# Patient Record
Sex: FEMALE | Race: BLACK OR AFRICAN AMERICAN | NOT HISPANIC OR LATINO | Employment: FULL TIME | RURAL
[De-identification: names, ages, dates, MRNs, and addresses within clinical notes are randomized per-mention and may not be internally consistent; named-entity substitution may affect disease eponyms.]

---

## 2018-06-12 LAB — CRC RECOMMENDATION EXT: NORMAL

## 2020-01-15 ENCOUNTER — HISTORICAL (OUTPATIENT)
Dept: ADMINISTRATIVE | Facility: HOSPITAL | Age: 50
End: 2020-01-15

## 2020-01-17 LAB
LAB AP CLINICAL INFORMATION: NORMAL
LAB AP DIAGNOSIS - HISTORICAL: NORMAL
LAB AP GROSS PATHOLOGY - HISTORICAL: NORMAL
LAB AP SPECIMEN SUBMITTED - HISTORICAL: NORMAL

## 2020-03-05 ENCOUNTER — HISTORICAL (OUTPATIENT)
Dept: ADMINISTRATIVE | Facility: HOSPITAL | Age: 50
End: 2020-03-05

## 2020-03-09 LAB
LAB AP CLINICAL INFORMATION: NORMAL
LAB AP GENERAL CAT - HISTORICAL: NORMAL
LAB AP INTERPRETATION/RESULT - HISTORICAL: NEGATIVE
LAB AP SPECIMEN ADEQUACY - HISTORICAL: NORMAL
LAB AP SPECIMEN SUBMITTED - HISTORICAL: NORMAL

## 2021-03-23 ENCOUNTER — HOSPITAL ENCOUNTER (OUTPATIENT)
Dept: RADIOLOGY | Facility: HOSPITAL | Age: 51
Discharge: HOME | End: 2021-03-23
Attending: RADIOLOGY
Payer: COMMERCIAL

## 2021-03-23 DIAGNOSIS — R92.8 ABNORMAL FINDING ON RADIOLOGICAL EXAMINATION OF BREAST: ICD-10-CM

## 2021-03-23 PROCEDURE — 76642 ULTRASOUND BREAST LIMITED: CPT | Mod: TC,LT

## 2021-03-24 DIAGNOSIS — Z97.5 IUD (INTRAUTERINE DEVICE) IN PLACE: Primary | ICD-10-CM

## 2021-06-01 RX ORDER — VALSARTAN AND HYDROCHLOROTHIAZIDE 80; 12.5 MG/1; MG/1
1 TABLET, FILM COATED ORAL DAILY
COMMUNITY

## 2021-06-01 RX ORDER — LEVONORGESTREL 52 MG/1
INTRAUTERINE DEVICE INTRAUTERINE
COMMUNITY
Start: 2021-02-03 | End: 2023-06-12

## 2021-06-07 ENCOUNTER — HOSPITAL ENCOUNTER (OUTPATIENT)
Dept: RADIOLOGY | Facility: HOSPITAL | Age: 51
Discharge: HOME | End: 2021-06-07
Attending: OBSTETRICS & GYNECOLOGY
Payer: COMMERCIAL

## 2021-06-07 ENCOUNTER — PROCEDURE VISIT (OUTPATIENT)
Dept: OBSTETRICS AND GYNECOLOGY | Facility: CLINIC | Age: 51
End: 2021-06-07
Payer: COMMERCIAL

## 2021-06-07 VITALS — DIASTOLIC BLOOD PRESSURE: 78 MMHG | WEIGHT: 293 LBS | SYSTOLIC BLOOD PRESSURE: 122 MMHG

## 2021-06-07 DIAGNOSIS — Z97.5 IUD (INTRAUTERINE DEVICE) IN PLACE: ICD-10-CM

## 2021-06-07 DIAGNOSIS — R87.820 CERVICAL LOW RISK HUMAN PAPILLOMAVIRUS (HPV) DNA TEST POSITIVE: Primary | ICD-10-CM

## 2021-06-07 PROCEDURE — 88142 CYTOPATH C/V THIN LAYER: CPT | Mod: GCY | Performed by: OBSTETRICS & GYNECOLOGY

## 2021-06-07 PROCEDURE — 76856 US EXAM PELVIC COMPLETE: CPT | Mod: 26,,, | Performed by: RADIOLOGY

## 2021-06-07 PROCEDURE — 99213 OFFICE O/P EST LOW 20 MIN: CPT | Mod: S$PBB,,, | Performed by: OBSTETRICS & GYNECOLOGY

## 2021-06-07 PROCEDURE — 76856 US EXAM PELVIC COMPLETE: CPT | Mod: TC

## 2021-06-07 PROCEDURE — 99213 PR OFFICE/OUTPT VISIT, EST, LEVL III, 20-29 MIN: ICD-10-PCS | Mod: S$PBB,,, | Performed by: OBSTETRICS & GYNECOLOGY

## 2021-06-07 PROCEDURE — 76830 TRANSVAGINAL US NON-OB: CPT | Mod: 26,,, | Performed by: RADIOLOGY

## 2021-06-07 PROCEDURE — 76856 US PELVIS COMP WITH TRANSVAG NON-OB (XPD): ICD-10-PCS | Mod: 26,,, | Performed by: RADIOLOGY

## 2021-06-07 PROCEDURE — 76830 US PELVIS COMP WITH TRANSVAG NON-OB (XPD): ICD-10-PCS | Mod: 26,,, | Performed by: RADIOLOGY

## 2021-06-07 PROCEDURE — 87624 HPV HI-RISK TYP POOLED RSLT: CPT | Performed by: OBSTETRICS & GYNECOLOGY

## 2021-06-07 RX ORDER — AMLODIPINE BESYLATE 5 MG/1
5 TABLET ORAL DAILY
COMMUNITY
Start: 2021-05-15

## 2021-06-07 RX ORDER — HYDROCHLOROTHIAZIDE 25 MG/1
25 TABLET ORAL DAILY
COMMUNITY
Start: 2021-05-15

## 2021-06-07 RX ORDER — MONTELUKAST SODIUM 10 MG/1
10 TABLET ORAL
COMMUNITY
Start: 2021-05-15

## 2021-06-09 LAB
GH SERPL-MCNC: NORMAL NG/ML
INSULIN SERPL-ACNC: NORMAL U[IU]/ML
LAB AP CLINICAL INFORMATION: NORMAL
LAB AP GYN INTERPRETATION: NEGATIVE
LAB AP PAP DISCLAIMER COMMENTS: NORMAL
RENIN PLAS-CCNC: NORMAL NG/ML/H

## 2021-06-13 LAB
HPV 16: NEGATIVE
HPV 18: NEGATIVE
HPV OTHER: POSITIVE

## 2021-06-24 DIAGNOSIS — R87.820 CERVICAL LOW RISK HUMAN PAPILLOMAVIRUS (HPV) DNA TEST POSITIVE: Primary | ICD-10-CM

## 2021-07-26 ENCOUNTER — PROCEDURE VISIT (OUTPATIENT)
Dept: OBSTETRICS AND GYNECOLOGY | Facility: CLINIC | Age: 51
End: 2021-07-26
Payer: COMMERCIAL

## 2021-07-26 VITALS — SYSTOLIC BLOOD PRESSURE: 130 MMHG | DIASTOLIC BLOOD PRESSURE: 80 MMHG

## 2021-07-26 DIAGNOSIS — R87.820 CERVICAL LOW RISK HUMAN PAPILLOMAVIRUS (HPV) DNA TEST POSITIVE: Primary | ICD-10-CM

## 2021-07-26 PROCEDURE — 88305 TISSUE EXAM BY PATHOLOGIST: CPT | Mod: SUR | Performed by: OBSTETRICS & GYNECOLOGY

## 2021-07-26 PROCEDURE — 88305 TISSUE EXAM BY PATHOLOGIST: CPT | Mod: 26,,, | Performed by: PATHOLOGY

## 2021-07-26 PROCEDURE — 57454 BX/CURETT OF CERVIX W/SCOPE: CPT | Mod: PBBFAC | Performed by: OBSTETRICS & GYNECOLOGY

## 2021-07-26 PROCEDURE — 88342 SURGICAL PATHOLOGY: ICD-10-PCS | Mod: 26,,, | Performed by: PATHOLOGY

## 2021-07-26 PROCEDURE — 57454 BX/CURETT OF CERVIX W/SCOPE: CPT | Mod: S$PBB,,, | Performed by: OBSTETRICS & GYNECOLOGY

## 2021-07-26 PROCEDURE — 88305 SURGICAL PATHOLOGY: ICD-10-PCS | Mod: 26,,, | Performed by: PATHOLOGY

## 2021-07-26 PROCEDURE — 88342 IMHCHEM/IMCYTCHM 1ST ANTB: CPT | Mod: 26,,, | Performed by: PATHOLOGY

## 2021-07-26 PROCEDURE — 57454 PR COLPOSC,CERVIX W/ADJ VAG,W/BX & CURRETAG: ICD-10-PCS | Mod: S$PBB,,, | Performed by: OBSTETRICS & GYNECOLOGY

## 2021-07-28 LAB
DHEA SERPL-MCNC: NORMAL
ESTROGEN SERPL-MCNC: NORMAL PG/ML
LAB AP CLINICAL INFORMATION: NORMAL
LAB AP GROSS DESCRIPTION: NORMAL
LAB AP LABORATORY NOTES: NORMAL
T3RU NFR SERPL: NORMAL %

## 2021-07-30 ENCOUNTER — TELEPHONE (OUTPATIENT)
Dept: OBSTETRICS AND GYNECOLOGY | Facility: CLINIC | Age: 51
End: 2021-07-30

## 2021-08-11 ENCOUNTER — TELEPHONE (OUTPATIENT)
Dept: OBSTETRICS AND GYNECOLOGY | Facility: CLINIC | Age: 51
End: 2021-08-11

## 2021-08-11 ENCOUNTER — OFFICE VISIT (OUTPATIENT)
Dept: OBSTETRICS AND GYNECOLOGY | Facility: CLINIC | Age: 51
End: 2021-08-11
Payer: COMMERCIAL

## 2021-08-11 DIAGNOSIS — R87.810 ATYPICAL SQUAMOUS CELL CHANGES OF UNDETERMINED SIGNIFICANCE (ASCUS) ON CERVICAL CYTOLOGY WITH POSITIVE HIGH RISK HUMAN PAPILLOMA VIRUS (HPV): Primary | ICD-10-CM

## 2021-08-11 DIAGNOSIS — R87.610 ATYPICAL SQUAMOUS CELL CHANGES OF UNDETERMINED SIGNIFICANCE (ASCUS) ON CERVICAL CYTOLOGY WITH POSITIVE HIGH RISK HUMAN PAPILLOMA VIRUS (HPV): Primary | ICD-10-CM

## 2021-08-11 PROCEDURE — 1160F PR REVIEW ALL MEDS BY PRESCRIBER/CLIN PHARMACIST DOCUMENTED: ICD-10-PCS | Mod: ,,, | Performed by: OBSTETRICS & GYNECOLOGY

## 2021-08-11 PROCEDURE — 1159F MED LIST DOCD IN RCRD: CPT | Mod: ,,, | Performed by: OBSTETRICS & GYNECOLOGY

## 2021-08-11 PROCEDURE — 1160F RVW MEDS BY RX/DR IN RCRD: CPT | Mod: ,,, | Performed by: OBSTETRICS & GYNECOLOGY

## 2021-08-11 PROCEDURE — 99499 UNLISTED E&M SERVICE: CPT | Mod: S$PBB,,, | Performed by: OBSTETRICS & GYNECOLOGY

## 2021-08-11 PROCEDURE — 99499 NO LOS: ICD-10-PCS | Mod: S$PBB,,, | Performed by: OBSTETRICS & GYNECOLOGY

## 2021-08-11 PROCEDURE — 99213 OFFICE O/P EST LOW 20 MIN: CPT | Mod: PBBFAC | Performed by: OBSTETRICS & GYNECOLOGY

## 2021-08-11 PROCEDURE — 1159F PR MEDICATION LIST DOCUMENTED IN MEDICAL RECORD: ICD-10-PCS | Mod: ,,, | Performed by: OBSTETRICS & GYNECOLOGY

## 2021-11-23 ENCOUNTER — HOSPITAL ENCOUNTER (OUTPATIENT)
Dept: RADIOLOGY | Facility: HOSPITAL | Age: 51
Discharge: HOME | End: 2021-11-23
Attending: RADIOLOGY
Payer: COMMERCIAL

## 2021-11-23 DIAGNOSIS — R92.8 ABNORMAL FINDING ON BREAST IMAGING: ICD-10-CM

## 2021-11-23 PROCEDURE — 76642 ULTRASOUND BREAST LIMITED: CPT | Mod: TC,LT

## 2021-11-23 PROCEDURE — 77065 DX MAMMO INCL CAD UNI: CPT | Mod: TC,LT

## 2022-05-24 ENCOUNTER — HOSPITAL ENCOUNTER (OUTPATIENT)
Dept: RADIOLOGY | Facility: HOSPITAL | Age: 52
Discharge: HOME | End: 2022-05-24
Attending: STUDENT IN AN ORGANIZED HEALTH CARE EDUCATION/TRAINING PROGRAM
Payer: COMMERCIAL

## 2022-05-24 DIAGNOSIS — R92.8 ABNORMAL FINDING ON RADIOLOGICAL EXAMINATION OF BREAST: ICD-10-CM

## 2022-05-24 PROCEDURE — 76642 ULTRASOUND BREAST LIMITED: CPT | Mod: TC,LT

## 2022-05-24 PROCEDURE — 25000003 PHARM REV CODE 250: Performed by: STUDENT IN AN ORGANIZED HEALTH CARE EDUCATION/TRAINING PROGRAM

## 2022-05-24 PROCEDURE — 19083 BX BREAST 1ST LESION US IMAG: CPT | Mod: LT

## 2022-05-24 PROCEDURE — A4648 IMPLANTABLE TISSUE MARKER: HCPCS

## 2022-05-24 PROCEDURE — 77065 DX MAMMO INCL CAD UNI: CPT | Mod: TC,LT

## 2022-05-24 PROCEDURE — 77066 DX MAMMO INCL CAD BI: CPT | Mod: TC

## 2022-05-24 RX ORDER — LIDOCAINE HYDROCHLORIDE 10 MG/ML
5 INJECTION INFILTRATION; PERINEURAL ONCE
Status: COMPLETED | OUTPATIENT
Start: 2022-05-24 | End: 2022-05-24

## 2022-05-24 RX ORDER — LIDOCAINE HYDROCHLORIDE AND EPINEPHRINE 10; 10 MG/ML; UG/ML
10 INJECTION, SOLUTION INFILTRATION; PERINEURAL ONCE
Status: COMPLETED | OUTPATIENT
Start: 2022-05-24 | End: 2022-05-24

## 2022-05-24 RX ADMIN — LIDOCAINE HYDROCHLORIDE 5 ML: 10 INJECTION, SOLUTION INFILTRATION; PERINEURAL at 11:05

## 2022-05-24 RX ADMIN — LIDOCAINE HYDROCHLORIDE,EPINEPHRINE BITARTRATE 10 ML: 10; .01 INJECTION, SOLUTION INFILTRATION; PERINEURAL at 11:05

## 2022-05-25 ENCOUNTER — TELEPHONE (OUTPATIENT)
Dept: RADIOLOGY | Facility: HOSPITAL | Age: 52
End: 2022-05-25

## 2022-05-25 LAB
DHEA SERPL-MCNC: NORMAL
ESTROGEN SERPL-MCNC: NORMAL PG/ML
INSULIN SERPL-ACNC: NORMAL U[IU]/ML
LAB AP CLINICAL INFORMATION: NORMAL
LAB AP GROSS DESCRIPTION: NORMAL
LAB AP LABORATORY NOTES: NORMAL
T3RU NFR SERPL: NORMAL %

## 2022-07-12 ENCOUNTER — OFFICE VISIT (OUTPATIENT)
Dept: OTOLARYNGOLOGY | Facility: CLINIC | Age: 52
End: 2022-07-12
Payer: COMMERCIAL

## 2022-07-12 VITALS — HEIGHT: 69 IN | WEIGHT: 293 LBS | BODY MASS INDEX: 43.4 KG/M2

## 2022-07-12 DIAGNOSIS — R09.81 MILD NASAL CONGESTION: ICD-10-CM

## 2022-07-12 DIAGNOSIS — S01.319A LACERATION OF EAR LOBE, UNSPECIFIED LATERALITY, INITIAL ENCOUNTER: Primary | ICD-10-CM

## 2022-07-12 DIAGNOSIS — K21.9 GASTROESOPHAGEAL REFLUX DISEASE WITHOUT ESOPHAGITIS: ICD-10-CM

## 2022-07-12 PROCEDURE — 1160F RVW MEDS BY RX/DR IN RCRD: CPT | Mod: ,,, | Performed by: OTOLARYNGOLOGY

## 2022-07-12 PROCEDURE — 99204 OFFICE O/P NEW MOD 45 MIN: CPT | Mod: S$PBB,,, | Performed by: OTOLARYNGOLOGY

## 2022-07-12 PROCEDURE — 3008F PR BODY MASS INDEX (BMI) DOCUMENTED: ICD-10-PCS | Mod: ,,, | Performed by: OTOLARYNGOLOGY

## 2022-07-12 PROCEDURE — 3008F BODY MASS INDEX DOCD: CPT | Mod: ,,, | Performed by: OTOLARYNGOLOGY

## 2022-07-12 PROCEDURE — 4010F PR ACE/ARB THEARPY RXD/TAKEN: ICD-10-PCS | Mod: ,,, | Performed by: OTOLARYNGOLOGY

## 2022-07-12 PROCEDURE — 1159F MED LIST DOCD IN RCRD: CPT | Mod: ,,, | Performed by: OTOLARYNGOLOGY

## 2022-07-12 PROCEDURE — 99214 OFFICE O/P EST MOD 30 MIN: CPT | Mod: PBBFAC | Performed by: OTOLARYNGOLOGY

## 2022-07-12 PROCEDURE — 1160F PR REVIEW ALL MEDS BY PRESCRIBER/CLIN PHARMACIST DOCUMENTED: ICD-10-PCS | Mod: ,,, | Performed by: OTOLARYNGOLOGY

## 2022-07-12 PROCEDURE — 1159F PR MEDICATION LIST DOCUMENTED IN MEDICAL RECORD: ICD-10-PCS | Mod: ,,, | Performed by: OTOLARYNGOLOGY

## 2022-07-12 PROCEDURE — 4010F ACE/ARB THERAPY RXD/TAKEN: CPT | Mod: ,,, | Performed by: OTOLARYNGOLOGY

## 2022-07-12 PROCEDURE — 99204 PR OFFICE/OUTPT VISIT, NEW, LEVL IV, 45-59 MIN: ICD-10-PCS | Mod: S$PBB,,, | Performed by: OTOLARYNGOLOGY

## 2022-07-12 NOTE — H&P (VIEW-ONLY)
Subjective:       Patient ID: Raina Nuñez is a 51 y.o. female.    Chief Complaint: Sinusitis (Sinus congestion. Pt states she can spit out thick clear mucus at times. ) and Other (Left earlobe split from heavy earrings and right earlobe has torn, denies pain to earlobes. )    HPI  Review of Systems   Constitutional: Negative for chills, fatigue and fever.   HENT: Positive for nasal congestion. Negative for ear discharge and ear pain.    Respiratory: Positive for cough.    Gastrointestinal: Positive for reflux.   All other systems reviewed and are negative.        Objective:      Physical Exam  Constitutional:       Appearance: Normal appearance. She is obese.   HENT:      Head: Normocephalic.      Right Ear: Tympanic membrane, ear canal and external ear normal. Laceration present.      Left Ear: Tympanic membrane, ear canal and external ear normal. Laceration present.      Ears:        Nose: Nose normal.      Right Turbinates: Enlarged.      Left Turbinates: Enlarged.      Mouth/Throat:      Lips: Pink.      Mouth: Mucous membranes are moist.      Pharynx: Oropharynx is clear.   Pulmonary:      Effort: Pulmonary effort is normal.   Skin:     General: Skin is warm and dry.   Neurological:      Mental Status: She is alert.   Psychiatric:         Mood and Affect: Mood normal.         Behavior: Behavior is cooperative.         Assessment:       Problem List Items Addressed This Visit    None     Visit Diagnoses     Laceration of ear lobe, unspecified laterality, initial encounter    -  Primary    Relevant Orders    Case Request Operating Room: REPAIR, LACERATION, BILATERAL EARLOBES (Completed)    HCG Qualitative Urine    Mild nasal congestion        Gastroesophageal reflux disease without esophagitis              Plan:   1. Nasacort  2. Prilosec  3. Laceration of Earlobe repair  Follow up after above.

## 2022-07-18 ENCOUNTER — TELEPHONE (OUTPATIENT)
Dept: OTOLARYNGOLOGY | Facility: CLINIC | Age: 52
End: 2022-07-18
Payer: COMMERCIAL

## 2022-07-22 ENCOUNTER — ANESTHESIA EVENT (OUTPATIENT)
Dept: SURGERY | Facility: HOSPITAL | Age: 52
End: 2022-07-22
Payer: COMMERCIAL

## 2022-07-22 ENCOUNTER — HOSPITAL ENCOUNTER (OUTPATIENT)
Facility: HOSPITAL | Age: 52
Discharge: HOME | End: 2022-07-22
Attending: OTOLARYNGOLOGY | Admitting: OTOLARYNGOLOGY
Payer: COMMERCIAL

## 2022-07-22 ENCOUNTER — ANESTHESIA (OUTPATIENT)
Dept: SURGERY | Facility: HOSPITAL | Age: 52
End: 2022-07-22
Payer: COMMERCIAL

## 2022-07-22 VITALS
RESPIRATION RATE: 18 BRPM | SYSTOLIC BLOOD PRESSURE: 101 MMHG | HEIGHT: 69 IN | BODY MASS INDEX: 43.4 KG/M2 | WEIGHT: 293 LBS | HEART RATE: 87 BPM | OXYGEN SATURATION: 99 % | TEMPERATURE: 98 F | DIASTOLIC BLOOD PRESSURE: 52 MMHG

## 2022-07-22 DIAGNOSIS — S01.312D LACERATION OF LEFT EARLOBE, SUBSEQUENT ENCOUNTER: ICD-10-CM

## 2022-07-22 DIAGNOSIS — S01.319D LACERATION OF EAR LOBE, UNSPECIFIED LATERALITY, SUBSEQUENT ENCOUNTER: Primary | ICD-10-CM

## 2022-07-22 DIAGNOSIS — S01.319A: ICD-10-CM

## 2022-07-22 LAB — POC URINE HCG: NEGATIVE

## 2022-07-22 PROCEDURE — 37000009 HC ANESTHESIA EA ADD 15 MINS: Performed by: OTOLARYNGOLOGY

## 2022-07-22 PROCEDURE — D9220A PRA ANESTHESIA: ICD-10-PCS | Mod: CRNA,,, | Performed by: NURSE ANESTHETIST, CERTIFIED REGISTERED

## 2022-07-22 PROCEDURE — 25000003 PHARM REV CODE 250: Performed by: NURSE ANESTHETIST, CERTIFIED REGISTERED

## 2022-07-22 PROCEDURE — 36000704 HC OR TIME LEV I 1ST 15 MIN: Performed by: OTOLARYNGOLOGY

## 2022-07-22 PROCEDURE — 27000177 HC AIRWAY, LARYNGEAL MASK: Performed by: ANESTHESIOLOGY

## 2022-07-22 PROCEDURE — 71000015 HC POSTOP RECOV 1ST HR: Performed by: OTOLARYNGOLOGY

## 2022-07-22 PROCEDURE — 25000003 PHARM REV CODE 250: Performed by: OTOLARYNGOLOGY

## 2022-07-22 PROCEDURE — 63600175 PHARM REV CODE 636 W HCPCS: Performed by: NURSE ANESTHETIST, CERTIFIED REGISTERED

## 2022-07-22 PROCEDURE — D9220A PRA ANESTHESIA: ICD-10-PCS | Mod: ANES,,, | Performed by: ANESTHESIOLOGY

## 2022-07-22 PROCEDURE — D9220A PRA ANESTHESIA: Mod: CRNA,,, | Performed by: NURSE ANESTHETIST, CERTIFIED REGISTERED

## 2022-07-22 PROCEDURE — 27201423 OPTIME MED/SURG SUP & DEVICES STERILE SUPPLY: Performed by: OTOLARYNGOLOGY

## 2022-07-22 PROCEDURE — 13151 PR RECMPL WND LID,NOS,EAR 1.1-2.5 CM: ICD-10-PCS | Mod: ,,, | Performed by: OTOLARYNGOLOGY

## 2022-07-22 PROCEDURE — 71000033 HC RECOVERY, INTIAL HOUR: Performed by: OTOLARYNGOLOGY

## 2022-07-22 PROCEDURE — 13151 CMPLX RPR E/N/E/L 1.1-2.5 CM: CPT | Mod: ,,, | Performed by: OTOLARYNGOLOGY

## 2022-07-22 PROCEDURE — D9220A PRA ANESTHESIA: Mod: ANES,,, | Performed by: ANESTHESIOLOGY

## 2022-07-22 PROCEDURE — 36000705 HC OR TIME LEV I EA ADD 15 MIN: Performed by: OTOLARYNGOLOGY

## 2022-07-22 PROCEDURE — 63600175 PHARM REV CODE 636 W HCPCS: Performed by: ANESTHESIOLOGY

## 2022-07-22 PROCEDURE — 25000003 PHARM REV CODE 250

## 2022-07-22 PROCEDURE — 27000716 HC OXISENSOR PROBE, ANY SIZE: Performed by: ANESTHESIOLOGY

## 2022-07-22 PROCEDURE — 27000510 HC BLANKET BAIR HUGGER ANY SIZE: Performed by: ANESTHESIOLOGY

## 2022-07-22 PROCEDURE — 37000008 HC ANESTHESIA 1ST 15 MINUTES: Performed by: OTOLARYNGOLOGY

## 2022-07-22 RX ORDER — PROPOFOL 10 MG/ML
VIAL (ML) INTRAVENOUS
Status: DISCONTINUED | OUTPATIENT
Start: 2022-07-22 | End: 2022-07-22

## 2022-07-22 RX ORDER — OXYCODONE HYDROCHLORIDE 5 MG/1
5 TABLET ORAL
Status: DISCONTINUED | OUTPATIENT
Start: 2022-07-22 | End: 2022-07-22 | Stop reason: HOSPADM

## 2022-07-22 RX ORDER — HYDROCODONE BITARTRATE AND ACETAMINOPHEN 7.5; 325 MG/1; MG/1
1 TABLET ORAL EVERY 6 HOURS PRN
Status: DISCONTINUED | OUTPATIENT
Start: 2022-07-22 | End: 2022-07-22 | Stop reason: HOSPADM

## 2022-07-22 RX ORDER — MEPERIDINE HYDROCHLORIDE 25 MG/ML
25 INJECTION INTRAMUSCULAR; INTRAVENOUS; SUBCUTANEOUS EVERY 10 MIN PRN
Status: DISCONTINUED | OUTPATIENT
Start: 2022-07-22 | End: 2022-07-22 | Stop reason: HOSPADM

## 2022-07-22 RX ORDER — MIDAZOLAM HYDROCHLORIDE 1 MG/ML
INJECTION INTRAMUSCULAR; INTRAVENOUS
Status: DISCONTINUED | OUTPATIENT
Start: 2022-07-22 | End: 2022-07-22

## 2022-07-22 RX ORDER — LIDOCAINE HYDROCHLORIDE AND EPINEPHRINE 10; 10 MG/ML; UG/ML
INJECTION, SOLUTION INFILTRATION; PERINEURAL
Status: DISCONTINUED | OUTPATIENT
Start: 2022-07-22 | End: 2022-07-22 | Stop reason: HOSPADM

## 2022-07-22 RX ORDER — ONDANSETRON 2 MG/ML
4 INJECTION INTRAMUSCULAR; INTRAVENOUS DAILY PRN
Status: DISCONTINUED | OUTPATIENT
Start: 2022-07-22 | End: 2022-07-22 | Stop reason: HOSPADM

## 2022-07-22 RX ORDER — HYDROMORPHONE HYDROCHLORIDE 2 MG/ML
0.5 INJECTION, SOLUTION INTRAMUSCULAR; INTRAVENOUS; SUBCUTANEOUS EVERY 5 MIN PRN
Status: DISCONTINUED | OUTPATIENT
Start: 2022-07-22 | End: 2022-07-22 | Stop reason: HOSPADM

## 2022-07-22 RX ORDER — EPHEDRINE SULFATE 50 MG/ML
INJECTION, SOLUTION INTRAVENOUS
Status: DISCONTINUED | OUTPATIENT
Start: 2022-07-22 | End: 2022-07-22

## 2022-07-22 RX ORDER — BACITRACIN ZINC 500 [USP'U]/G
OINTMENT TOPICAL
Status: DISCONTINUED | OUTPATIENT
Start: 2022-07-22 | End: 2022-07-22 | Stop reason: HOSPADM

## 2022-07-22 RX ORDER — MORPHINE SULFATE 8 MG/ML
4 INJECTION INTRAMUSCULAR; INTRAVENOUS; SUBCUTANEOUS EVERY 5 MIN PRN
Status: DISCONTINUED | OUTPATIENT
Start: 2022-07-22 | End: 2022-07-22 | Stop reason: HOSPADM

## 2022-07-22 RX ORDER — LIRAGLUTIDE 6 MG/ML
3 INJECTION, SOLUTION SUBCUTANEOUS
COMMUNITY
End: 2023-06-12

## 2022-07-22 RX ORDER — DEXAMETHASONE SODIUM PHOSPHATE 4 MG/ML
INJECTION, SOLUTION INTRA-ARTICULAR; INTRALESIONAL; INTRAMUSCULAR; INTRAVENOUS; SOFT TISSUE
Status: DISCONTINUED | OUTPATIENT
Start: 2022-07-22 | End: 2022-07-22

## 2022-07-22 RX ORDER — ONDANSETRON 2 MG/ML
INJECTION INTRAMUSCULAR; INTRAVENOUS
Status: DISCONTINUED | OUTPATIENT
Start: 2022-07-22 | End: 2022-07-22

## 2022-07-22 RX ORDER — LIDOCAINE HYDROCHLORIDE 20 MG/ML
INJECTION, SOLUTION EPIDURAL; INFILTRATION; INTRACAUDAL; PERINEURAL
Status: DISCONTINUED | OUTPATIENT
Start: 2022-07-22 | End: 2022-07-22

## 2022-07-22 RX ORDER — SODIUM CHLORIDE, SODIUM LACTATE, POTASSIUM CHLORIDE, CALCIUM CHLORIDE 600; 310; 30; 20 MG/100ML; MG/100ML; MG/100ML; MG/100ML
125 INJECTION, SOLUTION INTRAVENOUS CONTINUOUS
Status: DISCONTINUED | OUTPATIENT
Start: 2022-07-22 | End: 2022-07-22 | Stop reason: HOSPADM

## 2022-07-22 RX ORDER — FENTANYL CITRATE 50 UG/ML
INJECTION, SOLUTION INTRAMUSCULAR; INTRAVENOUS
Status: DISCONTINUED | OUTPATIENT
Start: 2022-07-22 | End: 2022-07-22

## 2022-07-22 RX ORDER — CEFAZOLIN SODIUM 1 G/3ML
INJECTION, POWDER, FOR SOLUTION INTRAMUSCULAR; INTRAVENOUS
Status: DISCONTINUED | OUTPATIENT
Start: 2022-07-22 | End: 2022-07-22

## 2022-07-22 RX ORDER — DIPHENHYDRAMINE HYDROCHLORIDE 50 MG/ML
25 INJECTION INTRAMUSCULAR; INTRAVENOUS EVERY 6 HOURS PRN
Status: DISCONTINUED | OUTPATIENT
Start: 2022-07-22 | End: 2022-07-22 | Stop reason: HOSPADM

## 2022-07-22 RX ADMIN — EPHEDRINE SULFATE 15 MG: 50 INJECTION INTRAVENOUS at 08:07

## 2022-07-22 RX ADMIN — MIDAZOLAM HYDROCHLORIDE 2 MG: 1 INJECTION, SOLUTION INTRAMUSCULAR; INTRAVENOUS at 08:07

## 2022-07-22 RX ADMIN — ONDANSETRON 8 MG: 2 INJECTION INTRAMUSCULAR; INTRAVENOUS at 08:07

## 2022-07-22 RX ADMIN — SODIUM CHLORIDE: 9 INJECTION, SOLUTION INTRAVENOUS at 08:07

## 2022-07-22 RX ADMIN — FENTANYL CITRATE 50 MCG: 50 INJECTION INTRAMUSCULAR; INTRAVENOUS at 08:07

## 2022-07-22 RX ADMIN — LIDOCAINE HYDROCHLORIDE 60 MG: 20 INJECTION, SOLUTION INTRAVENOUS at 08:07

## 2022-07-22 RX ADMIN — PROPOFOL 150 MG: 10 INJECTION, EMULSION INTRAVENOUS at 08:07

## 2022-07-22 RX ADMIN — DEXAMETHASONE SODIUM PHOSPHATE 8 MG: 4 INJECTION, SOLUTION INTRA-ARTICULAR; INTRALESIONAL; INTRAMUSCULAR; INTRAVENOUS; SOFT TISSUE at 08:07

## 2022-07-22 RX ADMIN — EPHEDRINE SULFATE 5 MG: 50 INJECTION INTRAVENOUS at 08:07

## 2022-07-22 RX ADMIN — CEFAZOLIN 2 G: 1 INJECTION, POWDER, FOR SOLUTION INTRAMUSCULAR; INTRAVENOUS; PARENTERAL at 08:07

## 2022-07-22 RX ADMIN — SODIUM CHLORIDE, POTASSIUM CHLORIDE, SODIUM LACTATE AND CALCIUM CHLORIDE 125 ML/HR: 600; 310; 30; 20 INJECTION, SOLUTION INTRAVENOUS at 09:07

## 2022-07-22 NOTE — ANESTHESIA POSTPROCEDURE EVALUATION
Anesthesia Post Evaluation    Patient: Raina Nuñez    Procedure(s) Performed: Procedure(s) (LRB):  REPAIR, LACERATION, BILATERAL EARLOBES (Bilateral)    Final Anesthesia Type: general      Patient location during evaluation: PACU  Post-procedure vital signs: reviewed and stable  Pain management: adequate  Airway patency: patent  ESTHER mitigation strategies: Extubation and recovery carried out in lateral, semiupright, or other nonsupine position  PONV status at discharge: No PONV  Anesthetic complications: no      Cardiovascular status: hemodynamically stable  Respiratory status: unassisted  Hydration status: euvolemic  Follow-up not needed.          Vitals Value Taken Time   /52 07/22/22 1047   Temp 36.5 °C (97.7 °F) 07/22/22 1050   Pulse 93 07/22/22 1049   Resp 18 07/22/22 1045   SpO2 98 % 07/22/22 1049   Vitals shown include unvalidated device data.      Event Time   Out of Recovery 09:47:00         Pain/Edgard Score: Edgard Score: 10 (7/22/2022  9:45 AM)

## 2022-07-22 NOTE — ANESTHESIA PREPROCEDURE EVALUATION
07/22/2022  Raina uNñez is a 52 y.o., female.      Pre-op Assessment    I have reviewed the Patient Summary Reports.     I have reviewed the Nursing Notes. I have reviewed the NPO Status.   I have reviewed the Medications.     Review of Systems  Anesthesia Hx:  No problems with previous Anesthesia    Social:  Non-Smoker, No Alcohol Use    Hematology/Oncology:  Hematology Normal   Oncology Normal     EENT/Dental:EENT/Dental Normal   Cardiovascular:   Hypertension    Pulmonary:  Pulmonary Normal    Renal/:  Renal/ Normal     Hepatic/GI:  Hepatic/GI Normal    Musculoskeletal:  Musculoskeletal Normal    Neurological:  Neurology Normal    Endocrine:  Endocrine Normal  Obesity / BMI > 30  Dermatological:  Skin Normal    Psych:  Psychiatric Normal           Physical Exam  General: Well nourished    Airway:  Mallampati: III / III  Mouth Opening: Normal  TM Distance: > 6 cm  Tongue: Normal  Neck ROM: Normal ROM    Chest/Lungs:  Clear to auscultation, Normal Respiratory Rate    Heart:  Rate: Normal  Rhythm: Regular Rhythm        Anesthesia Plan  Type of Anesthesia, risks & benefits discussed:    Anesthesia Type: Gen Supraglottic Airway  Intra-op Monitoring Plan: Standard ASA Monitors  Post Op Pain Control Plan: multimodal analgesia  Induction:  IV  Informed Consent: Informed consent signed with the Patient and all parties understand the risks and agree with anesthesia plan.  All questions answered.   ASA Score: 2  Day of Surgery Review of History & Physical: H&P Update referred to the surgeon/provider.I have interviewed and examined the patient. I have reviewed the patient's H&P dated: There are no significant changes. H&P completed by Anesthesiologist.    Ready For Surgery From Anesthesia Perspective.     .

## 2022-07-22 NOTE — TRANSFER OF CARE
"Anesthesia Transfer of Care Note    Patient: Raina Nuñez    Procedure(s) Performed: Procedure(s) (LRB):  REPAIR, LACERATION, BILATERAL EARLOBES (Bilateral)    Patient location: PACU    Anesthesia Type: general    Transport from OR: Transported from OR on 100% O2 by closed face mask with adequate spontaneous ventilation    Post pain: adequate analgesia    Post assessment: no apparent anesthetic complications    Post vital signs: stable    Level of consciousness: responds to stimulation    Nausea/Vomiting: no nausea/vomiting    Complications: none    Transfer of care protocol was followed      Last vitals:   Visit Vitals  BP (!) 133/35   Pulse 87   Temp 36.8 °C (98.3 °F)   Resp 11   Ht 5' 9" (1.753 m)   Wt (!) 154.2 kg (340 lb)   LMP 07/11/2022   SpO2 100%   Breastfeeding No   BMI 50.21 kg/m²     "

## 2022-07-22 NOTE — OR NURSING
0913 Rec'd pt to PACU asleep with oral airway in place. VSS. No signs of distress noted. No drainage noted from bilateral ear incision site - no dressing noted. Will continue to monitor.     0920 Oral airway removed. Respirations even and unlabored. Reoriented to time and place. Denies needs.     0947 Out of PACU. VSS. No signs of bleeding/distress noted.     0950 Pt to ASC 4 awake and alert. Mother at bedside. Bedside report given to BIMAL Lorenzana RN. No signs of distress noted. No bleeding/drainage noted to bilateral ear sites. Denies pain/needs. /47, P 86, R 18, O2 97% room air.

## 2022-07-22 NOTE — OP NOTE
After General  anesthesia the laceration was prepped and draped in a sterile fashion. It was infiltrated with local 1% xylocaine with epi 1:100,000 around 2 cc's  The laceration was freshened and was excised  to the subcutaneous tissue . Hemostasis was meticulously obtained it was closed subcuticular with 4 O vicryl the skin was closed with Nylon The opposite ear was done in a likewise manner The patient  tolerated procedure well was taken to RR in stable condition

## 2022-07-22 NOTE — ANESTHESIA PROCEDURE NOTES
Intubation    Date/Time: 7/22/2022 8:29 AM  Performed by: Zander Boyce CRNA  Authorized by: Jose Goodwin MD     Intubation:     Induction:  Intravenous    Intubated:  Postinduction    Attempts:  1    Attempted By:  CRNA    Difficult Airway Encountered?: No      Complications:  None    Airway Device:  Supraglottic airway/LMA    Airway Device Size:  3.0    Style/Cuff Inflation:  Cuffed (inflated to minimal occlusive pressure)    Placement Verified By:  Capnometry    Complicating Factors:  None    Findings Post-Intubation:  BS equal bilateral and atraumatic/condition of teeth unchanged

## 2022-07-28 ENCOUNTER — OFFICE VISIT (OUTPATIENT)
Dept: OTOLARYNGOLOGY | Facility: CLINIC | Age: 52
End: 2022-07-28
Payer: COMMERCIAL

## 2022-07-28 VITALS — BODY MASS INDEX: 43.4 KG/M2 | HEIGHT: 69 IN | WEIGHT: 293 LBS

## 2022-07-28 DIAGNOSIS — S01.319A LACERATION OF EAR LOBE, UNSPECIFIED LATERALITY, INITIAL ENCOUNTER: Primary | ICD-10-CM

## 2022-07-28 PROCEDURE — 99024 PR POST-OP FOLLOW-UP VISIT: ICD-10-PCS | Mod: S$PBB,,, | Performed by: OTOLARYNGOLOGY

## 2022-07-28 PROCEDURE — 1160F PR REVIEW ALL MEDS BY PRESCRIBER/CLIN PHARMACIST DOCUMENTED: ICD-10-PCS | Mod: ,,, | Performed by: OTOLARYNGOLOGY

## 2022-07-28 PROCEDURE — 99024 POSTOP FOLLOW-UP VISIT: CPT | Mod: S$PBB,,, | Performed by: OTOLARYNGOLOGY

## 2022-07-28 PROCEDURE — 3008F BODY MASS INDEX DOCD: CPT | Mod: ,,, | Performed by: OTOLARYNGOLOGY

## 2022-07-28 PROCEDURE — 4010F ACE/ARB THERAPY RXD/TAKEN: CPT | Mod: ,,, | Performed by: OTOLARYNGOLOGY

## 2022-07-28 PROCEDURE — 1159F MED LIST DOCD IN RCRD: CPT | Mod: ,,, | Performed by: OTOLARYNGOLOGY

## 2022-07-28 PROCEDURE — 1159F PR MEDICATION LIST DOCUMENTED IN MEDICAL RECORD: ICD-10-PCS | Mod: ,,, | Performed by: OTOLARYNGOLOGY

## 2022-07-28 PROCEDURE — 4010F PR ACE/ARB THEARPY RXD/TAKEN: ICD-10-PCS | Mod: ,,, | Performed by: OTOLARYNGOLOGY

## 2022-07-28 PROCEDURE — 1160F RVW MEDS BY RX/DR IN RCRD: CPT | Mod: ,,, | Performed by: OTOLARYNGOLOGY

## 2022-07-28 PROCEDURE — 99213 OFFICE O/P EST LOW 20 MIN: CPT | Mod: PBBFAC | Performed by: OTOLARYNGOLOGY

## 2022-07-28 PROCEDURE — 3008F PR BODY MASS INDEX (BMI) DOCUMENTED: ICD-10-PCS | Mod: ,,, | Performed by: OTOLARYNGOLOGY

## 2022-07-28 NOTE — PROGRESS NOTES
Subjective:       Patient ID: Raina Nuñez is a 52 y.o. female.    Chief Complaint: Follow-up (Patient is an 8 day post op for her ear lobes. She states she has some itching but is doing well.)    HPI  Review of Systems    Objective:      Physical Exam  Healing well  Assessment:       1. Laceration of ear lobe, unspecified laterality, initial encounter      sutures removed  Plan:       F/u prn

## 2022-08-16 ENCOUNTER — OFFICE VISIT (OUTPATIENT)
Dept: OBSTETRICS AND GYNECOLOGY | Facility: CLINIC | Age: 52
End: 2022-08-16
Payer: COMMERCIAL

## 2022-08-16 VITALS
DIASTOLIC BLOOD PRESSURE: 72 MMHG | SYSTOLIC BLOOD PRESSURE: 134 MMHG | WEIGHT: 293 LBS | BODY MASS INDEX: 43.4 KG/M2 | HEIGHT: 69 IN

## 2022-08-16 DIAGNOSIS — R87.820 CERVICAL LOW RISK HUMAN PAPILLOMAVIRUS (HPV) DNA TEST POSITIVE: ICD-10-CM

## 2022-08-16 DIAGNOSIS — Z12.4 SCREENING FOR MALIGNANT NEOPLASM OF THE CERVIX: ICD-10-CM

## 2022-08-16 DIAGNOSIS — Z13.1 SCREENING FOR DIABETES MELLITUS: Primary | ICD-10-CM

## 2022-08-16 DIAGNOSIS — Z13.220 SCREENING FOR LIPOID DISORDERS: ICD-10-CM

## 2022-08-16 DIAGNOSIS — Z01.419 WOMEN'S ANNUAL ROUTINE GYNECOLOGICAL EXAMINATION: Primary | ICD-10-CM

## 2022-08-16 PROCEDURE — 99213 OFFICE O/P EST LOW 20 MIN: CPT | Mod: PBBFAC | Performed by: OBSTETRICS & GYNECOLOGY

## 2022-08-16 PROCEDURE — 3078F PR MOST RECENT DIASTOLIC BLOOD PRESSURE < 80 MM HG: ICD-10-PCS | Mod: ,,, | Performed by: OBSTETRICS & GYNECOLOGY

## 2022-08-16 PROCEDURE — 4010F ACE/ARB THERAPY RXD/TAKEN: CPT | Mod: ,,, | Performed by: OBSTETRICS & GYNECOLOGY

## 2022-08-16 PROCEDURE — 4010F PR ACE/ARB THEARPY RXD/TAKEN: ICD-10-PCS | Mod: ,,, | Performed by: OBSTETRICS & GYNECOLOGY

## 2022-08-16 PROCEDURE — 87624 HUMAN PAPILLOMAVIRUS (HPV): ICD-10-PCS | Mod: ,,, | Performed by: CLINICAL MEDICAL LABORATORY

## 2022-08-16 PROCEDURE — 1159F PR MEDICATION LIST DOCUMENTED IN MEDICAL RECORD: ICD-10-PCS | Mod: ,,, | Performed by: OBSTETRICS & GYNECOLOGY

## 2022-08-16 PROCEDURE — 3075F PR MOST RECENT SYSTOLIC BLOOD PRESS GE 130-139MM HG: ICD-10-PCS | Mod: ,,, | Performed by: OBSTETRICS & GYNECOLOGY

## 2022-08-16 PROCEDURE — 88142 CYTOPATH C/V THIN LAYER: CPT | Mod: GCY | Performed by: OBSTETRICS & GYNECOLOGY

## 2022-08-16 PROCEDURE — 87624 HPV HI-RISK TYP POOLED RSLT: CPT | Mod: ,,, | Performed by: CLINICAL MEDICAL LABORATORY

## 2022-08-16 PROCEDURE — 3008F PR BODY MASS INDEX (BMI) DOCUMENTED: ICD-10-PCS | Mod: ,,, | Performed by: OBSTETRICS & GYNECOLOGY

## 2022-08-16 PROCEDURE — 1159F MED LIST DOCD IN RCRD: CPT | Mod: ,,, | Performed by: OBSTETRICS & GYNECOLOGY

## 2022-08-16 PROCEDURE — 99396 PR PREVENTIVE VISIT,EST,40-64: ICD-10-PCS | Mod: S$PBB,,, | Performed by: OBSTETRICS & GYNECOLOGY

## 2022-08-16 PROCEDURE — 3008F BODY MASS INDEX DOCD: CPT | Mod: ,,, | Performed by: OBSTETRICS & GYNECOLOGY

## 2022-08-16 PROCEDURE — 3078F DIAST BP <80 MM HG: CPT | Mod: ,,, | Performed by: OBSTETRICS & GYNECOLOGY

## 2022-08-16 PROCEDURE — 99396 PREV VISIT EST AGE 40-64: CPT | Mod: S$PBB,,, | Performed by: OBSTETRICS & GYNECOLOGY

## 2022-08-16 PROCEDURE — 3075F SYST BP GE 130 - 139MM HG: CPT | Mod: ,,, | Performed by: OBSTETRICS & GYNECOLOGY

## 2022-08-16 NOTE — PATIENT INSTRUCTIONS
Discussed notifying me if any intermenstrual spotting occurs.    Continue yearly screening mammography.    Continue routine glucose cholesterol testing by primary care provider.    We discussed continuing colonoscopy screening.  She states she is due in 2023 she will notify me if she does not receive appointment with gastroenterology.    Follow-up with me yearly or p.r.n. if problems arise.

## 2022-08-16 NOTE — PROGRESS NOTES
Subjective:       Patient ID: Raina Nuñez is a 52 y.o. female.    Chief Complaint: Annual Exam (Here for check up and pap-hx of positive hpv x2 , colposcopy done last year (see path report))    Presents for yearly gyn check.    History of positive HPV x2.  However follow-up colposcopic exam and biopsies revealed no definitive dysplasia.  We discussed repeating Pap today.    She is presently using Mirena IUD.  Continues to have monthly menses approximately 3-5 days.  No intermenstrual spotting.    Review of Systems      Objective:      Physical Exam  Exam conducted with a chaperone present.   HENT:      Head: Normocephalic.      Nose: Nose normal.   Eyes:      Pupils: Pupils are equal, round, and reactive to light.   Cardiovascular:      Rate and Rhythm: Normal rate.   Pulmonary:      Effort: Pulmonary effort is normal.      Breath sounds: Normal breath sounds.   Chest:      Comments: Breasts without palpable masses no skin retraction or nipple dimpling.  Mammography screening done May 2022 received normal letter  Abdominal:      General: Abdomen is flat.      Palpations: Abdomen is soft.   Genitourinary:     General: Normal vulva.      Vagina: Normal.      Cervix: Normal.      Uterus: Normal.       Adnexa: Right adnexa normal and left adnexa normal.      Rectum: Normal.      Comments: External normal vault normal cervix normal to appearance.  Pap taken.  Previous Pap 6/7/21-with positive HPV.    Bimanual exam including rectovaginal exam was unremarkable hemoccult was negative.  Colonoscopy screening done 2018. She will have follow-up colonoscopy as directed by Gastroenterology   Musculoskeletal:         General: Normal range of motion.      Cervical back: Full passive range of motion without pain, normal range of motion and neck supple.   Skin:     General: Skin is dry.   Neurological:      General: No focal deficit present.      Mental Status: She is alert.   Psychiatric:         Attention and  Perception: Attention normal.         Mood and Affect: Mood normal.         Assessment:       1. Women's annual routine gynecological examination    2. Cervical low risk human papillomavirus (HPV) DNA test positive    3. Screening for malignant neoplasm of the cervix        Plan:       Patient Instructions   Discussed notifying me if any intermenstrual spotting occurs.    Continue yearly screening mammography.    Continue routine glucose cholesterol testing by primary care provider.    We discussed continuing colonoscopy screening.  She states she is due in 2023 she will notify me if she does not receive appointment with gastroenterology.    Follow-up with me yearly or p.r.n. if problems arise.

## 2022-08-23 LAB
HPV 16: NEGATIVE
HPV 18: NEGATIVE
HPV OTHER: POSITIVE

## 2022-08-25 DIAGNOSIS — R87.810 CERVICAL HIGH RISK HPV (HUMAN PAPILLOMAVIRUS) TEST POSITIVE: Primary | ICD-10-CM

## 2022-11-21 ENCOUNTER — PROCEDURE VISIT (OUTPATIENT)
Dept: OBSTETRICS AND GYNECOLOGY | Facility: CLINIC | Age: 52
End: 2022-11-21
Payer: COMMERCIAL

## 2022-11-21 VITALS — DIASTOLIC BLOOD PRESSURE: 74 MMHG | SYSTOLIC BLOOD PRESSURE: 124 MMHG

## 2022-11-21 DIAGNOSIS — R87.810 CERVICAL HIGH RISK HPV (HUMAN PAPILLOMAVIRUS) TEST POSITIVE: Primary | ICD-10-CM

## 2022-11-21 DIAGNOSIS — N92.1 MENORRHAGIA WITH IRREGULAR CYCLE: ICD-10-CM

## 2022-11-21 PROCEDURE — 88342 IMHCHEM/IMCYTCHM 1ST ANTB: CPT | Mod: 26,,, | Performed by: PATHOLOGY

## 2022-11-21 PROCEDURE — 88305 TISSUE EXAM BY PATHOLOGIST: CPT | Mod: 26,XU,, | Performed by: PATHOLOGY

## 2022-11-21 PROCEDURE — 88305 SURGICAL PATHOLOGY: ICD-10-PCS | Mod: 26,XU,, | Performed by: PATHOLOGY

## 2022-11-21 PROCEDURE — 88342 IMHCHEM/IMCYTCHM 1ST ANTB: CPT | Mod: SUR | Performed by: OBSTETRICS & GYNECOLOGY

## 2022-11-21 PROCEDURE — 88342 SURGICAL PATHOLOGY: ICD-10-PCS | Mod: 26,,, | Performed by: PATHOLOGY

## 2022-11-21 PROCEDURE — 57454 BX/CURETT OF CERVIX W/SCOPE: CPT | Mod: PBBFAC | Performed by: OBSTETRICS & GYNECOLOGY

## 2022-11-21 PROCEDURE — 57454 BX/CURETT OF CERVIX W/SCOPE: CPT | Mod: S$PBB,,, | Performed by: OBSTETRICS & GYNECOLOGY

## 2022-11-21 PROCEDURE — 57454 PR COLPOSC,CERVIX W/ADJ VAG,W/BX & CURRETAG: ICD-10-PCS | Mod: S$PBB,,, | Performed by: OBSTETRICS & GYNECOLOGY

## 2022-11-21 RX ORDER — LEVOFLOXACIN 500 MG/1
500 TABLET, FILM COATED ORAL DAILY
Status: ON HOLD | COMMUNITY
Start: 2022-11-14 | End: 2023-03-06

## 2022-11-21 RX ORDER — DESOXIMETASONE 2.5 MG/G
CREAM TOPICAL
COMMUNITY
Start: 2022-11-07

## 2022-11-21 NOTE — PATIENT INSTRUCTIONS
Later in the office we discussed colposcopic findings.  We will follow-up cervical biopsy pathology and ECC pathology.      However it is noted that even using IUD she has significantly heavy menses lasting up to 7 days.  Occasionally will require changing bed sheets at night.      She is used over-the-counter medication for dysmenorrhea with only moderate success.      A leave suggested.      On review of chart it is noted that in March of 2021 pelvic ultrasound at that time revealed the uterus measuring 13.0 x 6.7 x 6.7 cm.    We discussed the probability of uterine leiomyoma.    She will have follow-up with me in 3 weeks    We will discussed pathology we will also perform ultrasound at that time.      Check CBC and hCG upon leaving today.

## 2022-11-21 NOTE — PROCEDURES
Raina Nuñez is a 52 y.o. female patient.    BP: 124/74 (11/21/22 1328)       Procedures    11/21/2022      Presents today for colposcopic exam.      Recent Pap was negative but HPV other was persistently positive.    Similar results noted last year.  Therefore she was asked have colposcopic exam.      Cervix was visualized painted with ascetic acid.  On careful examination faint aceto-white area was noted at 11 and 10:00 a.m..  The squamocolumnar junction is inverted slightly within the endocervical canal and could not be seen.  However there was no obvious faint aceto-white area within the endocervical canal.  Biopsies were taken at 10 and 11:00 a.m. other random biopsies were taken at 12 3 and 6:00 a.m. ECC was performed.      IUD string again noted.

## 2022-11-28 ENCOUNTER — HOSPITAL ENCOUNTER (OUTPATIENT)
Dept: RADIOLOGY | Facility: HOSPITAL | Age: 52
Discharge: HOME | End: 2022-11-28
Payer: COMMERCIAL

## 2022-11-28 ENCOUNTER — TELEPHONE (OUTPATIENT)
Dept: RADIOLOGY | Facility: HOSPITAL | Age: 52
End: 2022-11-28
Payer: COMMERCIAL

## 2022-11-28 DIAGNOSIS — Z12.31 BREAST CANCER SCREENING BY MAMMOGRAM: ICD-10-CM

## 2022-11-28 PROCEDURE — 77067 SCR MAMMO BI INCL CAD: CPT | Mod: TC

## 2022-12-14 ENCOUNTER — OFFICE VISIT (OUTPATIENT)
Dept: OBSTETRICS AND GYNECOLOGY | Facility: CLINIC | Age: 52
End: 2022-12-14
Payer: COMMERCIAL

## 2022-12-14 ENCOUNTER — HOSPITAL ENCOUNTER (OUTPATIENT)
Dept: RADIOLOGY | Facility: HOSPITAL | Age: 52
Discharge: HOME | End: 2022-12-14
Attending: OBSTETRICS & GYNECOLOGY
Payer: COMMERCIAL

## 2022-12-14 DIAGNOSIS — N94.6 DYSMENORRHEA: ICD-10-CM

## 2022-12-14 DIAGNOSIS — N92.1 MENORRHAGIA WITH IRREGULAR CYCLE: ICD-10-CM

## 2022-12-14 DIAGNOSIS — N92.0 MENORRHAGIA WITH REGULAR CYCLE: Primary | ICD-10-CM

## 2022-12-14 PROCEDURE — 3044F PR MOST RECENT HEMOGLOBIN A1C LEVEL <7.0%: ICD-10-PCS | Mod: ,,, | Performed by: OBSTETRICS & GYNECOLOGY

## 2022-12-14 PROCEDURE — 4010F ACE/ARB THERAPY RXD/TAKEN: CPT | Mod: ,,, | Performed by: OBSTETRICS & GYNECOLOGY

## 2022-12-14 PROCEDURE — 99212 OFFICE O/P EST SF 10 MIN: CPT | Mod: PBBFAC,25 | Performed by: OBSTETRICS & GYNECOLOGY

## 2022-12-14 PROCEDURE — 4010F PR ACE/ARB THEARPY RXD/TAKEN: ICD-10-PCS | Mod: ,,, | Performed by: OBSTETRICS & GYNECOLOGY

## 2022-12-14 PROCEDURE — 76856 US PELVIS COMP WITH TRANSVAG NON-OB (XPD): ICD-10-PCS | Mod: 26,,, | Performed by: RADIOLOGY

## 2022-12-14 PROCEDURE — 76856 US EXAM PELVIC COMPLETE: CPT | Mod: 26,,, | Performed by: RADIOLOGY

## 2022-12-14 PROCEDURE — 3044F HG A1C LEVEL LT 7.0%: CPT | Mod: ,,, | Performed by: OBSTETRICS & GYNECOLOGY

## 2022-12-14 PROCEDURE — 99499 UNLISTED E&M SERVICE: CPT | Mod: S$PBB,,, | Performed by: OBSTETRICS & GYNECOLOGY

## 2022-12-14 PROCEDURE — 76830 TRANSVAGINAL US NON-OB: CPT | Mod: 26,,, | Performed by: RADIOLOGY

## 2022-12-14 PROCEDURE — 76830 US PELVIS COMP WITH TRANSVAG NON-OB (XPD): ICD-10-PCS | Mod: 26,,, | Performed by: RADIOLOGY

## 2022-12-14 PROCEDURE — 76856 US EXAM PELVIC COMPLETE: CPT | Mod: TC

## 2022-12-14 PROCEDURE — 99499 NO LOS: ICD-10-PCS | Mod: S$PBB,,, | Performed by: OBSTETRICS & GYNECOLOGY

## 2022-12-14 RX ORDER — VALSARTAN 80 MG/1
TABLET ORAL
COMMUNITY
Start: 2022-12-13

## 2022-12-14 NOTE — PROGRESS NOTES
Subjective:       Patient ID: Raina Nuñez is a 52 y.o. female.    Chief Complaint: Follow-up (Last seen in Nov for Colposcopy, Here for f/u Pelvic u/s and to discuss MRI of breast that was recommended per Radioologist)    Presents today for discussion only patient has a history of significant menorrhagia with dysmenorrhea.  Presently using Mirena IUD.  However heavy menses with requiring changing pads throughout the night's soiling undergarments.  In addition she has significant dysmenorrhea occasionally limiting activity.      Follow-up ultrasound today revealed uterus measuring 12.5 x 6.6 x 6.8 cm.  Uterus heterogeneous consistent with small uterine leiomyoma.  Right ovary 3.0 x 2.8 x 3.5 cm with a small 1.9 x 1.6 x 2.3 cm cyst left ovary 2.7 x 2.0 x 2.2 cm.  IUD confirmed in lower uterine segment.      We discussed these are similar size fibroids noted on previous ultrasound 2021.      We further discussed she is had significant menorrhagia unrelieved by IUD.      Because of dysmenorrhea component and uterine fibroids I do not recommend proceeding with ablation.      We discussed proceeding with hysterectomy.  She would like to proceed with hysterectomy secondary to above symptoms.          Problem 2.  We discussed recent mammogram was within normal limits benign findings however risk assessment places her at 23% risk.  We discussed that with risk above 20% as recommended to have additional screening     I will discussed with Dr. Mcdonald having 3D ultrasound done at [facility].    Review of Systems      Objective:      Physical Exam    Assessment:       1. Menorrhagia with regular cycle    2. Dysmenorrhea        Plan:       Patient Instructions   Risks versus benefits of proceeding with hysterectomy discussed     Hysterectomy scheduled.      I will obtain follow-up appointment at [facility] for 3D ultrasound.  She will notify me if she does not receive appointment

## 2022-12-14 NOTE — PATIENT INSTRUCTIONS
Risks versus benefits of proceeding with hysterectomy discussed     Hysterectomy scheduled.      I will obtain follow-up appointment at [facility] for 3D ultrasound.  She will notify me if she does not receive appointment

## 2022-12-15 DIAGNOSIS — N94.6 DYSMENORRHEA: ICD-10-CM

## 2022-12-15 DIAGNOSIS — N92.0 MENORRHAGIA WITH REGULAR CYCLE: Primary | ICD-10-CM

## 2022-12-15 DIAGNOSIS — D25.9 UTERINE LEIOMYOMA, UNSPECIFIED LOCATION: ICD-10-CM

## 2023-01-09 DIAGNOSIS — Z80.3 FHX: BREAST CANCER: Primary | ICD-10-CM

## 2023-01-09 DIAGNOSIS — Z91.89 AT INCREASED RISK OF BREAST CANCER: ICD-10-CM

## 2023-02-27 DIAGNOSIS — Z01.818 PRE-OP EVALUATION: Primary | ICD-10-CM

## 2023-02-28 ENCOUNTER — OFFICE VISIT (OUTPATIENT)
Dept: OBSTETRICS AND GYNECOLOGY | Facility: CLINIC | Age: 53
End: 2023-02-28
Payer: COMMERCIAL

## 2023-02-28 VITALS
BODY MASS INDEX: 43.4 KG/M2 | WEIGHT: 293 LBS | SYSTOLIC BLOOD PRESSURE: 128 MMHG | DIASTOLIC BLOOD PRESSURE: 72 MMHG | HEIGHT: 69 IN

## 2023-02-28 DIAGNOSIS — N92.0 MENORRHAGIA WITH REGULAR CYCLE: Primary | ICD-10-CM

## 2023-02-28 PROCEDURE — 3078F DIAST BP <80 MM HG: CPT | Mod: ,,, | Performed by: OBSTETRICS & GYNECOLOGY

## 2023-02-28 PROCEDURE — 99213 OFFICE O/P EST LOW 20 MIN: CPT | Mod: PBBFAC | Performed by: OBSTETRICS & GYNECOLOGY

## 2023-02-28 PROCEDURE — 4010F ACE/ARB THERAPY RXD/TAKEN: CPT | Mod: ,,, | Performed by: OBSTETRICS & GYNECOLOGY

## 2023-02-28 PROCEDURE — 3078F PR MOST RECENT DIASTOLIC BLOOD PRESSURE < 80 MM HG: ICD-10-PCS | Mod: ,,, | Performed by: OBSTETRICS & GYNECOLOGY

## 2023-02-28 PROCEDURE — 3008F PR BODY MASS INDEX (BMI) DOCUMENTED: ICD-10-PCS | Mod: ,,, | Performed by: OBSTETRICS & GYNECOLOGY

## 2023-02-28 PROCEDURE — 3074F PR MOST RECENT SYSTOLIC BLOOD PRESSURE < 130 MM HG: ICD-10-PCS | Mod: ,,, | Performed by: OBSTETRICS & GYNECOLOGY

## 2023-02-28 PROCEDURE — 3074F SYST BP LT 130 MM HG: CPT | Mod: ,,, | Performed by: OBSTETRICS & GYNECOLOGY

## 2023-02-28 PROCEDURE — 1159F PR MEDICATION LIST DOCUMENTED IN MEDICAL RECORD: ICD-10-PCS | Mod: ,,, | Performed by: OBSTETRICS & GYNECOLOGY

## 2023-02-28 PROCEDURE — 1159F MED LIST DOCD IN RCRD: CPT | Mod: ,,, | Performed by: OBSTETRICS & GYNECOLOGY

## 2023-02-28 PROCEDURE — 99024 POSTOP FOLLOW-UP VISIT: CPT | Mod: ,,, | Performed by: OBSTETRICS & GYNECOLOGY

## 2023-02-28 PROCEDURE — 4010F PR ACE/ARB THEARPY RXD/TAKEN: ICD-10-PCS | Mod: ,,, | Performed by: OBSTETRICS & GYNECOLOGY

## 2023-02-28 PROCEDURE — 99024 PR POST-OP FOLLOW-UP VISIT: ICD-10-PCS | Mod: ,,, | Performed by: OBSTETRICS & GYNECOLOGY

## 2023-02-28 PROCEDURE — 3008F BODY MASS INDEX DOCD: CPT | Mod: ,,, | Performed by: OBSTETRICS & GYNECOLOGY

## 2023-02-28 NOTE — H&P (VIEW-ONLY)
[facility] - Obstetrics And Gynecology  Obstetrics & Gynecology  History & Physical    Patient Name: Raina Nuñez  MRN: [id number]  Admission Date: (Not on file)  Primary Care Provider: Isidoro Johnson II, MD    Subjective:     Chief Complaint/Reason for Admission:  Admitted for robotic hysterectomy with bilateral salpingo-oophorectomy and removal of Mirena IUD.    History of Present Illness:  See office notes below      Office note December 14, 2022    Bryan Badillo MD at 12/14/2022 10:00 AM    Status: Signed         Subjective:       Patient ID: Raina Nuñez is a 52 y.o. female.     Chief Complaint: Follow-up (Last seen in Nov for Colposcopy, Here for f/u Pelvic u/s and to discuss MRI of breast that was recommended per Radioologist)     Presents today for discussion only patient has a history of significant menorrhagia with dysmenorrhea.  Presently using Mirena IUD.  However heavy menses with requiring changing pads throughout the night's soiling undergarments.  In addition she has significant dysmenorrhea occasionally limiting activity.       Follow-up ultrasound today revealed uterus measuring 12.5 x 6.6 x 6.8 cm.  Uterus heterogeneous consistent with small uterine leiomyoma.  Right ovary 3.0 x 2.8 x 3.5 cm with a small 1.9 x 1.6 x 2.3 cm cyst left ovary 2.7 x 2.0 x 2.2 cm.  IUD confirmed in lower uterine segment.       We discussed these are similar size fibroids noted on previous ultrasound 2021.       We further discussed she is had significant menorrhagia unrelieved by IUD.       Because of dysmenorrhea component and uterine fibroids I do not recommend proceeding with ablation.       We discussed proceeding with hysterectomy.  She would like to proceed with hysterectomy secondary to above symptoms.             Problem 2.  We discussed recent mammogram was within normal limits benign findings however risk assessment places her at 23% risk.  We discussed that with risk  above 20% as recommended to have additional screening      I will discussed with Dr. Mcdonald having 3D ultrasound done at [facility].      3D screening was later done by Dr. Mcdonald no abnormalities were noted.  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  Therefore colposcopic exam was done biopsies revealed no evidence of dysplasia.      Current Outpatient Medications on File Prior to Visit   Medication Sig    desoximetasone (TOPICORT) 0.25 % cream APPLY CREAM EXTERNALLY TWICE DAILY. RUB IN GENTLY AND COMPLETELY    hydroCHLOROthiazide (HYDRODIURIL) 25 MG tablet Take 25 mg by mouth once daily.    montelukast (SINGULAIR) 10 mg tablet Take 10 mg by mouth as needed.    valsartan (DIOVAN) 80 MG tablet     amLODIPine (NORVASC) 5 MG tablet Take 5 mg by mouth once daily.    levoFLOXacin (LEVAQUIN) 500 MG tablet Take 500 mg by mouth once daily.    liraglutide, weight loss, (SAXENDA) 3 mg/0.5 mL (18 mg/3 mL) PnIj Inject 3 mg into the skin.    MIRENA 20 mcg/24 hours (6 yrs) 52 mg IUD     valsartan-hydrochlorothiazide (DIOVAN-HCT) 80-12.5 mg per tablet Take 1 tablet by mouth once daily.     No current facility-administered medications on file prior to visit.       Review of patient's allergies indicates:  No Known Allergies    Past Medical History:   Diagnosis Date    Hypertension      OB History    Para Term  AB Living   2 2 2         SAB IAB Ectopic Multiple Live Births                  # Outcome Date GA Lbr Joe/2nd Weight Sex Delivery Anes PTL Lv   2 Term            1 Term              Past Surgical History:   Procedure Laterality Date    BREAST BIOPSY      BREAST SURGERY       SECTION      HYSTEROSCOPY WITH DILATION AND CURETTAGE OF UTERUS      REPAIR OF LACERATION Bilateral 2022    Procedure: REPAIR, LACERATION, BILATERAL EARLOBES;  Surgeon: Sukumar Osuna MD;  Location: [facility];  Service: ENT;  Laterality: Bilateral;     Family  History       Problem Relation (Age of Onset)    Breast cancer Mother    Cancer Maternal Grandmother    Hypertension Maternal Grandmother, Mother          Tobacco Use    Smoking status: Never    Smokeless tobacco: Never   Substance and Sexual Activity    Alcohol use: Yes     Comment: on rare occassions    Drug use: Never    Sexual activity: Yes     Partners: Male     Birth control/protection: I.U.D.     Review of Systems   Respiratory: Negative.     Cardiovascular: Negative.    Endocrine: Negative.    Neurological: Negative.    Hematological: Negative.    Psychiatric/Behavioral: Negative.     Objective:     Vital Signs (Most Recent):  BP: 128/72 (02/28/23 0756) Vital Signs (24h Range):  [unfilled]     Weight: (!) 156.7 kg (345 lb 6.4 oz)  Body mass index is 51.01 kg/m².  Patient's last menstrual period was 02/20/2023 (exact date).    Physical Exam    Laboratory:    Lab Visit on 02/28/2023   Component Date Value Ref Range Status    Sodium 02/28/2023 136  136 - 145 mmol/L Final    Potassium 02/28/2023 3.3 (L)  3.5 - 5.1 mmol/L Final    Chloride 02/28/2023 101  98 - 107 mmol/L Final    CO2 02/28/2023 33 (H)  21 - 32 mmol/L Final    Anion Gap 02/28/2023 5 (L)  7 - 16 mmol/L Final    Glucose 02/28/2023 102  74 - 106 mg/dL Final    BUN 02/28/2023 10  7 - 18 mg/dL Final    Creatinine 02/28/2023 0.95  0.55 - 1.02 mg/dL Final    BUN/Creatinine Ratio 02/28/2023 11  6 - 20 Final    Calcium 02/28/2023 8.8  8.5 - 10.1 mg/dL Final    Total Protein 02/28/2023 7.0  6.4 - 8.2 g/dL Final    Albumin 02/28/2023 3.1 (L)  3.5 - 5.0 g/dL Final    Globulin 02/28/2023 3.9  2.0 - 4.0 g/dL Final    A/G Ratio 02/28/2023 0.8   Final    Bilirubin, Total 02/28/2023 0.5  >0.0 - 1.2 mg/dL Final    Alk Phos 02/28/2023 74  41 - 108 U/L Final    ALT 02/28/2023 15  13 - 56 U/L Final    AST 02/28/2023 14 (L)  15 - 37 U/L Final    eGFR 02/28/2023 73  >=60 mL/min/1.73m² Final    Group & Rh 02/28/2023 A POS   Final    Indirect [place] 02/28/2023 NEG   Final     Pregnancy, Serum 02/28/2023 Negative  Negative, QNS Final    WBC 02/28/2023 5.28  4.50 - 11.00 K/uL Final    RBC 02/28/2023 4.48  4.20 - 5.40 M/uL Final    Hemoglobin 02/28/2023 12.5  12.0 - 16.0 g/dL Final    Hematocrit 02/28/2023 38.9  38.0 - 47.0 % Final    MCV 02/28/2023 86.8  80.0 - 96.0 fL Final    MCH 02/28/2023 27.9  27.0 - 31.0 pg Final    MCHC 02/28/2023 32.1  32.0 - 36.0 g/dL Final    RDW 02/28/2023 14.2  11.5 - 14.5 % Final    Platelet Count 02/28/2023 289  150 - 400 K/uL Final    MPV 02/28/2023 10.0  9.4 - 12.4 fL Final    Neutrophils % 02/28/2023 55.8  53.0 - 65.0 % Final    Lymphocytes % 02/28/2023 32.6  27.0 - 41.0 % Final    Monocytes % 02/28/2023 9.1 (H)  2.0 - 6.0 % Final    Eosinophils % 02/28/2023 1.9  1.0 - 4.0 % Final    Basophils % 02/28/2023 0.2  0.0 - 1.0 % Final    Immature Granulocytes % 02/28/2023 0.4  0.0 - 0.4 % Final    nRBC, Auto 02/28/2023 0.0  <=0.0 % Final    Neutrophils, Abs 02/28/2023 2.95  1.80 - 7.70 K/uL Final    Lymphocytes, Absolute 02/28/2023 1.72  1.00 - 4.80 K/uL Final    Monocytes, Absolute 02/28/2023 0.48  0.00 - 0.80 K/uL Final    Eosinophils, Absolute 02/28/2023 0.10  0.00 - 0.50 K/uL Final    Basophils, Absolute 02/28/2023 0.01  0.00 - 0.20 K/uL Final    Immature Granulocytes, Absolute 02/28/2023 0.02  0.00 - 0.04 K/uL Final    nRBC, Absolute 02/28/2023 0.00  <=0.00 x10e3/uL Final    Diff Type 02/28/2023 Auto   Final                        Assessment/Plan:   Discussed proceeding with removal of IUD and robotically assisted hysterectomy with BSO.      The risk of surgery including anesthetic risk bleeding infection blood clots bowel bladder or ureter injury discussed.    pyridium prescribed.    Will give prophylactic heparin 5000 units subQ preoperatively secondary to BMI of 51.           Bryan Badillo MD  Obstetrics & Gynecology  [facility] - Obstetrics And Gynecology

## 2023-02-28 NOTE — PROGRESS NOTES
[facility] - Obstetrics And Gynecology  Obstetrics & Gynecology  History & Physical    Patient Name: Raina Nuñez  MRN: [id number]  Admission Date: (Not on file)  Primary Care Provider: Isidoro Johnson II, MD    Subjective:     Chief Complaint/Reason for Admission:  Admitted for robotic hysterectomy with bilateral salpingo-oophorectomy and removal of Mirena IUD.    History of Present Illness:  See office notes below      Office note December 14, 2022    Bryan Badillo MD at 12/14/2022 10:00 AM    Status: Signed         Subjective:       Patient ID: Raina Nuñez is a 52 y.o. female.     Chief Complaint: Follow-up (Last seen in Nov for Colposcopy, Here for f/u Pelvic u/s and to discuss MRI of breast that was recommended per Radioologist)     Presents today for discussion only patient has a history of significant menorrhagia with dysmenorrhea.  Presently using Mirena IUD.  However heavy menses with requiring changing pads throughout the night's soiling undergarments.  In addition she has significant dysmenorrhea occasionally limiting activity.       Follow-up ultrasound today revealed uterus measuring 12.5 x 6.6 x 6.8 cm.  Uterus heterogeneous consistent with small uterine leiomyoma.  Right ovary 3.0 x 2.8 x 3.5 cm with a small 1.9 x 1.6 x 2.3 cm cyst left ovary 2.7 x 2.0 x 2.2 cm.  IUD confirmed in lower uterine segment.       We discussed these are similar size fibroids noted on previous ultrasound 2021.       We further discussed she is had significant menorrhagia unrelieved by IUD.       Because of dysmenorrhea component and uterine fibroids I do not recommend proceeding with ablation.       We discussed proceeding with hysterectomy.  She would like to proceed with hysterectomy secondary to above symptoms.             Problem 2.  We discussed recent mammogram was within normal limits benign findings however risk assessment places her at 23% risk.  We discussed that with risk  above 20% as recommended to have additional screening      I will discussed with Dr. Mcdonald having 3D ultrasound done at [facility].      3D screening was later done by Dr. Mcdonald no abnormalities were noted.  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  Therefore colposcopic exam was done biopsies revealed no evidence of dysplasia.      Current Outpatient Medications on File Prior to Visit   Medication Sig    desoximetasone (TOPICORT) 0.25 % cream APPLY CREAM EXTERNALLY TWICE DAILY. RUB IN GENTLY AND COMPLETELY    hydroCHLOROthiazide (HYDRODIURIL) 25 MG tablet Take 25 mg by mouth once daily.    montelukast (SINGULAIR) 10 mg tablet Take 10 mg by mouth as needed.    valsartan (DIOVAN) 80 MG tablet     amLODIPine (NORVASC) 5 MG tablet Take 5 mg by mouth once daily.    levoFLOXacin (LEVAQUIN) 500 MG tablet Take 500 mg by mouth once daily.    liraglutide, weight loss, (SAXENDA) 3 mg/0.5 mL (18 mg/3 mL) PnIj Inject 3 mg into the skin.    MIRENA 20 mcg/24 hours (6 yrs) 52 mg IUD     valsartan-hydrochlorothiazide (DIOVAN-HCT) 80-12.5 mg per tablet Take 1 tablet by mouth once daily.     No current facility-administered medications on file prior to visit.       Review of patient's allergies indicates:  No Known Allergies    Past Medical History:   Diagnosis Date    Hypertension      OB History    Para Term  AB Living   2 2 2         SAB IAB Ectopic Multiple Live Births                  # Outcome Date GA Lbr Joe/2nd Weight Sex Delivery Anes PTL Lv   2 Term            1 Term              Past Surgical History:   Procedure Laterality Date    BREAST BIOPSY      BREAST SURGERY       SECTION      HYSTEROSCOPY WITH DILATION AND CURETTAGE OF UTERUS      REPAIR OF LACERATION Bilateral 2022    Procedure: REPAIR, LACERATION, BILATERAL EARLOBES;  Surgeon: Sukumar Osuna MD;  Location: [facility];  Service: ENT;  Laterality: Bilateral;     Family  History       Problem Relation (Age of Onset)    Breast cancer Mother    Cancer Maternal Grandmother    Hypertension Maternal Grandmother, Mother          Tobacco Use    Smoking status: Never    Smokeless tobacco: Never   Substance and Sexual Activity    Alcohol use: Yes     Comment: on rare occassions    Drug use: Never    Sexual activity: Yes     Partners: Male     Birth control/protection: I.U.D.     Review of Systems   Respiratory: Negative.     Cardiovascular: Negative.    Endocrine: Negative.    Neurological: Negative.    Hematological: Negative.    Psychiatric/Behavioral: Negative.     Objective:     Vital Signs (Most Recent):  BP: 128/72 (02/28/23 0756) Vital Signs (24h Range):  [unfilled]     Weight: (!) 156.7 kg (345 lb 6.4 oz)  Body mass index is 51.01 kg/m².  Patient's last menstrual period was 02/20/2023 (exact date).    Physical Exam    Laboratory:    Lab Visit on 02/28/2023   Component Date Value Ref Range Status    Sodium 02/28/2023 136  136 - 145 mmol/L Final    Potassium 02/28/2023 3.3 (L)  3.5 - 5.1 mmol/L Final    Chloride 02/28/2023 101  98 - 107 mmol/L Final    CO2 02/28/2023 33 (H)  21 - 32 mmol/L Final    Anion Gap 02/28/2023 5 (L)  7 - 16 mmol/L Final    Glucose 02/28/2023 102  74 - 106 mg/dL Final    BUN 02/28/2023 10  7 - 18 mg/dL Final    Creatinine 02/28/2023 0.95  0.55 - 1.02 mg/dL Final    BUN/Creatinine Ratio 02/28/2023 11  6 - 20 Final    Calcium 02/28/2023 8.8  8.5 - 10.1 mg/dL Final    Total Protein 02/28/2023 7.0  6.4 - 8.2 g/dL Final    Albumin 02/28/2023 3.1 (L)  3.5 - 5.0 g/dL Final    Globulin 02/28/2023 3.9  2.0 - 4.0 g/dL Final    A/G Ratio 02/28/2023 0.8   Final    Bilirubin, Total 02/28/2023 0.5  >0.0 - 1.2 mg/dL Final    Alk Phos 02/28/2023 74  41 - 108 U/L Final    ALT 02/28/2023 15  13 - 56 U/L Final    AST 02/28/2023 14 (L)  15 - 37 U/L Final    eGFR 02/28/2023 73  >=60 mL/min/1.73m² Final    Group & Rh 02/28/2023 A POS   Final    Indirect Edinson 02/28/2023 NEG   Final     Pregnancy, Serum 02/28/2023 Negative  Negative, QNS Final    WBC 02/28/2023 5.28  4.50 - 11.00 K/uL Final    RBC 02/28/2023 4.48  4.20 - 5.40 M/uL Final    Hemoglobin 02/28/2023 12.5  12.0 - 16.0 g/dL Final    Hematocrit 02/28/2023 38.9  38.0 - 47.0 % Final    MCV 02/28/2023 86.8  80.0 - 96.0 fL Final    MCH 02/28/2023 27.9  27.0 - 31.0 pg Final    MCHC 02/28/2023 32.1  32.0 - 36.0 g/dL Final    RDW 02/28/2023 14.2  11.5 - 14.5 % Final    Platelet Count 02/28/2023 289  150 - 400 K/uL Final    MPV 02/28/2023 10.0  9.4 - 12.4 fL Final    Neutrophils % 02/28/2023 55.8  53.0 - 65.0 % Final    Lymphocytes % 02/28/2023 32.6  27.0 - 41.0 % Final    Monocytes % 02/28/2023 9.1 (H)  2.0 - 6.0 % Final    Eosinophils % 02/28/2023 1.9  1.0 - 4.0 % Final    Basophils % 02/28/2023 0.2  0.0 - 1.0 % Final    Immature Granulocytes % 02/28/2023 0.4  0.0 - 0.4 % Final    nRBC, Auto 02/28/2023 0.0  <=0.0 % Final    Neutrophils, Abs 02/28/2023 2.95  1.80 - 7.70 K/uL Final    Lymphocytes, Absolute 02/28/2023 1.72  1.00 - 4.80 K/uL Final    Monocytes, Absolute 02/28/2023 0.48  0.00 - 0.80 K/uL Final    Eosinophils, Absolute 02/28/2023 0.10  0.00 - 0.50 K/uL Final    Basophils, Absolute 02/28/2023 0.01  0.00 - 0.20 K/uL Final    Immature Granulocytes, Absolute 02/28/2023 0.02  0.00 - 0.04 K/uL Final    nRBC, Absolute 02/28/2023 0.00  <=0.00 x10e3/uL Final    Diff Type 02/28/2023 Auto   Final                        Assessment/Plan:   Discussed proceeding with removal of IUD and robotically assisted hysterectomy with BSO.      The risk of surgery including anesthetic risk bleeding infection blood clots bowel bladder or ureter injury discussed.    pyridium prescribed.    Will give prophylactic heparin 5000 units subQ preoperatively secondary to BMI of 51.           Bryan Badillo MD  Obstetrics & Gynecology  [facility] - Obstetrics And Gynecology

## 2023-02-28 NOTE — PATIENT INSTRUCTIONS
Discussed proceeding with robotically assisted hysterectomy with BSO secondary to persistent menorrhagia and dysmenorrhea unresponsive to conservative management.      The risk of surgery including anesthetic risk bleeding infection blood clots bowel bladder or ureter injury have been discussed.      Pyridium  prescribed to be taken preoperatively.

## 2023-03-02 RX ORDER — POTASSIUM CHLORIDE 20 MEQ/1
20 TABLET, EXTENDED RELEASE ORAL DAILY
Qty: 30 TABLET | Refills: 0 | Status: SHIPPED | OUTPATIENT
Start: 2023-03-02 | End: 2024-03-01

## 2023-03-06 ENCOUNTER — HOSPITAL ENCOUNTER (OUTPATIENT)
Facility: HOSPITAL | Age: 53
Discharge: HOME | End: 2023-03-07
Attending: OBSTETRICS & GYNECOLOGY | Admitting: OBSTETRICS & GYNECOLOGY
Payer: COMMERCIAL

## 2023-03-06 ENCOUNTER — ANESTHESIA EVENT (OUTPATIENT)
Dept: SURGERY | Facility: HOSPITAL | Age: 53
End: 2023-03-06
Payer: COMMERCIAL

## 2023-03-06 ENCOUNTER — ANESTHESIA (OUTPATIENT)
Dept: SURGERY | Facility: HOSPITAL | Age: 53
End: 2023-03-06
Payer: COMMERCIAL

## 2023-03-06 DIAGNOSIS — N92.0 MENORRHAGIA WITH REGULAR CYCLE: ICD-10-CM

## 2023-03-06 DIAGNOSIS — N92.0 MENORRHAGIA: ICD-10-CM

## 2023-03-06 DIAGNOSIS — N94.6 DYSMENORRHEA: ICD-10-CM

## 2023-03-06 DIAGNOSIS — D25.9 UTERINE LEIOMYOMA, UNSPECIFIED LOCATION: ICD-10-CM

## 2023-03-06 LAB
B-HCG UR QL: NEGATIVE
CTP QC/QA: YES
HCT VFR BLD AUTO: 41.9 % (ref 38–47)
HGB BLD-MCNC: 13 G/DL (ref 12–16)
POTASSIUM SERPL-SCNC: 3.7 MMOL/L (ref 3.5–5.1)

## 2023-03-06 PROCEDURE — 36000713 HC OR TIME LEV V EA ADD 15 MIN: Performed by: OBSTETRICS & GYNECOLOGY

## 2023-03-06 PROCEDURE — 88300 SURGICAL PATHOLOGY: ICD-10-PCS | Mod: 26,,, | Performed by: PATHOLOGY

## 2023-03-06 PROCEDURE — 63600175 PHARM REV CODE 636 W HCPCS: Performed by: NURSE ANESTHETIST, CERTIFIED REGISTERED

## 2023-03-06 PROCEDURE — 88300 SURGICAL PATH GROSS: CPT | Mod: TC,SUR | Performed by: OBSTETRICS & GYNECOLOGY

## 2023-03-06 PROCEDURE — 71000039 HC RECOVERY, EACH ADD'L HOUR: Performed by: OBSTETRICS & GYNECOLOGY

## 2023-03-06 PROCEDURE — 63600175 PHARM REV CODE 636 W HCPCS: Performed by: ANESTHESIOLOGY

## 2023-03-06 PROCEDURE — 85014 HEMATOCRIT: CPT | Performed by: OBSTETRICS & GYNECOLOGY

## 2023-03-06 PROCEDURE — 71000033 HC RECOVERY, INTIAL HOUR: Performed by: OBSTETRICS & GYNECOLOGY

## 2023-03-06 PROCEDURE — 88307 SURGICAL PATHOLOGY: ICD-10-PCS | Mod: 26,,, | Performed by: PATHOLOGY

## 2023-03-06 PROCEDURE — 25000003 PHARM REV CODE 250: Performed by: NURSE ANESTHETIST, CERTIFIED REGISTERED

## 2023-03-06 PROCEDURE — 37000009 HC ANESTHESIA EA ADD 15 MINS: Performed by: OBSTETRICS & GYNECOLOGY

## 2023-03-06 PROCEDURE — 37000008 HC ANESTHESIA 1ST 15 MINUTES: Performed by: OBSTETRICS & GYNECOLOGY

## 2023-03-06 PROCEDURE — C2628 CATHETER, OCCLUSION: HCPCS | Performed by: OBSTETRICS & GYNECOLOGY

## 2023-03-06 PROCEDURE — 88307 TISSUE EXAM BY PATHOLOGIST: CPT | Mod: 26,,, | Performed by: PATHOLOGY

## 2023-03-06 PROCEDURE — 63600175 PHARM REV CODE 636 W HCPCS: Performed by: OBSTETRICS & GYNECOLOGY

## 2023-03-06 PROCEDURE — 27201423 OPTIME MED/SURG SUP & DEVICES STERILE SUPPLY: Performed by: OBSTETRICS & GYNECOLOGY

## 2023-03-06 PROCEDURE — 84132 ASSAY OF SERUM POTASSIUM: CPT | Performed by: OBSTETRICS & GYNECOLOGY

## 2023-03-06 PROCEDURE — 94761 N-INVAS EAR/PLS OXIMETRY MLT: CPT

## 2023-03-06 PROCEDURE — 36000712 HC OR TIME LEV V 1ST 15 MIN: Performed by: OBSTETRICS & GYNECOLOGY

## 2023-03-06 PROCEDURE — D9220A PRA ANESTHESIA: Mod: CRNA,,, | Performed by: NURSE ANESTHETIST, CERTIFIED REGISTERED

## 2023-03-06 PROCEDURE — 81025 URINE PREGNANCY TEST: CPT | Performed by: OBSTETRICS & GYNECOLOGY

## 2023-03-06 PROCEDURE — D9220A PRA ANESTHESIA: Mod: ANES,,, | Performed by: ANESTHESIOLOGY

## 2023-03-06 PROCEDURE — 88307 TISSUE EXAM BY PATHOLOGIST: CPT | Mod: TC,SUR | Performed by: OBSTETRICS & GYNECOLOGY

## 2023-03-06 PROCEDURE — D9220A PRA ANESTHESIA: ICD-10-PCS | Mod: ANES,,, | Performed by: ANESTHESIOLOGY

## 2023-03-06 PROCEDURE — D9220A PRA ANESTHESIA: ICD-10-PCS | Mod: CRNA,,, | Performed by: NURSE ANESTHETIST, CERTIFIED REGISTERED

## 2023-03-06 PROCEDURE — 58573 PR LAPAROSCOPY TOT HYSTERECTOMY UTERUS >250 GRAM W TUBE/OVARY: ICD-10-PCS | Mod: ,,, | Performed by: OBSTETRICS & GYNECOLOGY

## 2023-03-06 PROCEDURE — 88300 SURGICAL PATH GROSS: CPT | Mod: 26,,, | Performed by: PATHOLOGY

## 2023-03-06 PROCEDURE — 25000003 PHARM REV CODE 250: Performed by: OBSTETRICS & GYNECOLOGY

## 2023-03-06 PROCEDURE — 99900035 HC TECH TIME PER 15 MIN (STAT)

## 2023-03-06 PROCEDURE — 58573 TLH W/T/O UTERUS OVER 250 G: CPT | Mod: ,,, | Performed by: OBSTETRICS & GYNECOLOGY

## 2023-03-06 RX ORDER — DIPHENHYDRAMINE HYDROCHLORIDE 50 MG/ML
25 INJECTION INTRAMUSCULAR; INTRAVENOUS EVERY 6 HOURS PRN
Status: DISCONTINUED | OUTPATIENT
Start: 2023-03-06 | End: 2023-03-06 | Stop reason: HOSPADM

## 2023-03-06 RX ORDER — HYDROMORPHONE HYDROCHLORIDE 2 MG/ML
0.5 INJECTION, SOLUTION INTRAMUSCULAR; INTRAVENOUS; SUBCUTANEOUS EVERY 5 MIN PRN
Status: DISCONTINUED | OUTPATIENT
Start: 2023-03-06 | End: 2023-03-06 | Stop reason: HOSPADM

## 2023-03-06 RX ORDER — MEPERIDINE HYDROCHLORIDE 25 MG/ML
25 INJECTION INTRAMUSCULAR; INTRAVENOUS; SUBCUTANEOUS EVERY 10 MIN PRN
Status: DISCONTINUED | OUTPATIENT
Start: 2023-03-06 | End: 2023-03-06 | Stop reason: HOSPADM

## 2023-03-06 RX ORDER — GLYCOPYRROLATE 0.2 MG/ML
INJECTION INTRAMUSCULAR; INTRAVENOUS
Status: DISCONTINUED | OUTPATIENT
Start: 2023-03-06 | End: 2023-03-06

## 2023-03-06 RX ORDER — HYDROMORPHONE HYDROCHLORIDE 2 MG/ML
INJECTION, SOLUTION INTRAMUSCULAR; INTRAVENOUS; SUBCUTANEOUS
Status: DISCONTINUED | OUTPATIENT
Start: 2023-03-06 | End: 2023-03-06

## 2023-03-06 RX ORDER — PROPOFOL 10 MG/ML
VIAL (ML) INTRAVENOUS
Status: DISCONTINUED | OUTPATIENT
Start: 2023-03-06 | End: 2023-03-06

## 2023-03-06 RX ORDER — ROCURONIUM BROMIDE 10 MG/ML
INJECTION, SOLUTION INTRAVENOUS
Status: DISCONTINUED | OUTPATIENT
Start: 2023-03-06 | End: 2023-03-06

## 2023-03-06 RX ORDER — ONDANSETRON 2 MG/ML
INJECTION INTRAMUSCULAR; INTRAVENOUS
Status: DISCONTINUED | OUTPATIENT
Start: 2023-03-06 | End: 2023-03-06

## 2023-03-06 RX ORDER — ONDANSETRON 2 MG/ML
4 INJECTION INTRAMUSCULAR; INTRAVENOUS EVERY 6 HOURS PRN
Status: DISCONTINUED | OUTPATIENT
Start: 2023-03-06 | End: 2023-03-07 | Stop reason: HOSPADM

## 2023-03-06 RX ORDER — TRAMADOL HYDROCHLORIDE 50 MG/1
50 TABLET ORAL EVERY 6 HOURS PRN
Status: DISCONTINUED | OUTPATIENT
Start: 2023-03-06 | End: 2023-03-07 | Stop reason: HOSPADM

## 2023-03-06 RX ORDER — BISACODYL 10 MG
10 SUPPOSITORY, RECTAL RECTAL DAILY PRN
Status: DISCONTINUED | OUTPATIENT
Start: 2023-03-06 | End: 2023-03-07 | Stop reason: HOSPADM

## 2023-03-06 RX ORDER — OXYCODONE HYDROCHLORIDE 5 MG/1
5 TABLET ORAL
Status: DISCONTINUED | OUTPATIENT
Start: 2023-03-06 | End: 2023-03-06 | Stop reason: HOSPADM

## 2023-03-06 RX ORDER — FAMOTIDINE 20 MG/1
20 TABLET, FILM COATED ORAL 2 TIMES DAILY
Status: DISCONTINUED | OUTPATIENT
Start: 2023-03-06 | End: 2023-03-07 | Stop reason: HOSPADM

## 2023-03-06 RX ORDER — HEPARIN SODIUM 5000 [USP'U]/ML
5000 INJECTION, SOLUTION INTRAVENOUS; SUBCUTANEOUS ONCE
Status: COMPLETED | OUTPATIENT
Start: 2023-03-06 | End: 2023-03-06

## 2023-03-06 RX ORDER — ONDANSETRON 2 MG/ML
4 INJECTION INTRAMUSCULAR; INTRAVENOUS DAILY PRN
Status: DISCONTINUED | OUTPATIENT
Start: 2023-03-06 | End: 2023-03-06 | Stop reason: HOSPADM

## 2023-03-06 RX ORDER — SODIUM CHLORIDE, SODIUM LACTATE, POTASSIUM CHLORIDE, CALCIUM CHLORIDE 600; 310; 30; 20 MG/100ML; MG/100ML; MG/100ML; MG/100ML
INJECTION, SOLUTION INTRAVENOUS CONTINUOUS
Status: DISCONTINUED | OUTPATIENT
Start: 2023-03-06 | End: 2023-03-07 | Stop reason: HOSPADM

## 2023-03-06 RX ORDER — FUROSEMIDE 10 MG/ML
INJECTION INTRAMUSCULAR; INTRAVENOUS
Status: DISCONTINUED | OUTPATIENT
Start: 2023-03-06 | End: 2023-03-06

## 2023-03-06 RX ORDER — FENTANYL CITRATE 50 UG/ML
INJECTION, SOLUTION INTRAMUSCULAR; INTRAVENOUS
Status: DISCONTINUED | OUTPATIENT
Start: 2023-03-06 | End: 2023-03-06

## 2023-03-06 RX ORDER — MORPHINE SULFATE 10 MG/ML
4 INJECTION INTRAMUSCULAR; INTRAVENOUS; SUBCUTANEOUS EVERY 5 MIN PRN
Status: DISCONTINUED | OUTPATIENT
Start: 2023-03-06 | End: 2023-03-06 | Stop reason: HOSPADM

## 2023-03-06 RX ORDER — PHENYLEPHRINE HYDROCHLORIDE 10 MG/ML
INJECTION INTRAVENOUS
Status: DISCONTINUED | OUTPATIENT
Start: 2023-03-06 | End: 2023-03-06

## 2023-03-06 RX ORDER — SODIUM CHLORIDE, SODIUM LACTATE, POTASSIUM CHLORIDE, CALCIUM CHLORIDE 600; 310; 30; 20 MG/100ML; MG/100ML; MG/100ML; MG/100ML
125 INJECTION, SOLUTION INTRAVENOUS CONTINUOUS
Status: DISCONTINUED | OUTPATIENT
Start: 2023-03-06 | End: 2023-03-07 | Stop reason: HOSPADM

## 2023-03-06 RX ORDER — MIDAZOLAM HYDROCHLORIDE 1 MG/ML
INJECTION INTRAMUSCULAR; INTRAVENOUS
Status: DISCONTINUED | OUTPATIENT
Start: 2023-03-06 | End: 2023-03-06

## 2023-03-06 RX ORDER — MORPHINE SULFATE 4 MG/ML
4 INJECTION, SOLUTION INTRAMUSCULAR; INTRAVENOUS
Status: DISCONTINUED | OUTPATIENT
Start: 2023-03-06 | End: 2023-03-07 | Stop reason: HOSPADM

## 2023-03-06 RX ORDER — DEXAMETHASONE SODIUM PHOSPHATE 4 MG/ML
INJECTION, SOLUTION INTRA-ARTICULAR; INTRALESIONAL; INTRAMUSCULAR; INTRAVENOUS; SOFT TISSUE
Status: DISCONTINUED | OUTPATIENT
Start: 2023-03-06 | End: 2023-03-06

## 2023-03-06 RX ORDER — LIDOCAINE HYDROCHLORIDE 20 MG/ML
INJECTION, SOLUTION EPIDURAL; INFILTRATION; INTRACAUDAL; PERINEURAL
Status: DISCONTINUED | OUTPATIENT
Start: 2023-03-06 | End: 2023-03-06

## 2023-03-06 RX ORDER — LIDOCAINE HYDROCHLORIDE 10 MG/ML
1 INJECTION, SOLUTION EPIDURAL; INFILTRATION; INTRACAUDAL; PERINEURAL ONCE
Status: DISCONTINUED | OUTPATIENT
Start: 2023-03-06 | End: 2023-03-07 | Stop reason: HOSPADM

## 2023-03-06 RX ADMIN — FUROSEMIDE 10 MG: 10 INJECTION, SOLUTION INTRAMUSCULAR; INTRAVENOUS at 10:03

## 2023-03-06 RX ADMIN — MEPERIDINE HYDROCHLORIDE 25 MG: 25 INJECTION INTRAMUSCULAR; INTRAVENOUS; SUBCUTANEOUS at 12:03

## 2023-03-06 RX ADMIN — PHENYLEPHRINE HYDROCHLORIDE 100 MCG: 10 INJECTION INTRAVENOUS at 09:03

## 2023-03-06 RX ADMIN — ONDANSETRON HYDROCHLORIDE 4 MG: 2 SOLUTION INTRAMUSCULAR; INTRAVENOUS at 02:03

## 2023-03-06 RX ADMIN — ROCURONIUM BROMIDE 50 MG: 10 INJECTION, SOLUTION INTRAVENOUS at 07:03

## 2023-03-06 RX ADMIN — MORPHINE SULFATE 4 MG: 4 INJECTION, SOLUTION INTRAMUSCULAR; INTRAVENOUS at 02:03

## 2023-03-06 RX ADMIN — PROPOFOL 150 MG: 10 INJECTION, EMULSION INTRAVENOUS at 07:03

## 2023-03-06 RX ADMIN — FENTANYL CITRATE 100 MCG: 50 INJECTION INTRAMUSCULAR; INTRAVENOUS at 07:03

## 2023-03-06 RX ADMIN — ONDANSETRON 4 MG: 2 INJECTION INTRAMUSCULAR; INTRAVENOUS at 08:03

## 2023-03-06 RX ADMIN — ALUMINUM HYDROXIDE AND MAGNESIUM HYDROXIDE 30 ML: 200; 200 SUSPENSION ORAL at 09:03

## 2023-03-06 RX ADMIN — FAMOTIDINE 20 MG: 20 TABLET, FILM COATED ORAL at 08:03

## 2023-03-06 RX ADMIN — ROCURONIUM BROMIDE 20 MG: 10 INJECTION, SOLUTION INTRAVENOUS at 08:03

## 2023-03-06 RX ADMIN — HYDROMORPHONE HYDROCHLORIDE 1 MG: 2 INJECTION, SOLUTION INTRAMUSCULAR; INTRAVENOUS; SUBCUTANEOUS at 09:03

## 2023-03-06 RX ADMIN — DEXAMETHASONE SODIUM PHOSPHATE 8 MG: 4 INJECTION, SOLUTION INTRA-ARTICULAR; INTRALESIONAL; INTRAMUSCULAR; INTRAVENOUS; SOFT TISSUE at 08:03

## 2023-03-06 RX ADMIN — TRAMADOL HYDROCHLORIDE 50 MG: 50 TABLET, COATED ORAL at 08:03

## 2023-03-06 RX ADMIN — SODIUM CHLORIDE, POTASSIUM CHLORIDE, SODIUM LACTATE AND CALCIUM CHLORIDE: 600; 310; 30; 20 INJECTION, SOLUTION INTRAVENOUS at 07:03

## 2023-03-06 RX ADMIN — PHENYLEPHRINE HYDROCHLORIDE 100 MCG: 10 INJECTION INTRAVENOUS at 08:03

## 2023-03-06 RX ADMIN — GLYCOPYRROLATE 0.2 MG: 0.2 INJECTION INTRAMUSCULAR; INTRAVENOUS at 08:03

## 2023-03-06 RX ADMIN — MIDAZOLAM 2 MG: 1 INJECTION INTRAMUSCULAR; INTRAVENOUS at 07:03

## 2023-03-06 RX ADMIN — ROCURONIUM BROMIDE 10 MG: 10 INJECTION, SOLUTION INTRAVENOUS at 09:03

## 2023-03-06 RX ADMIN — LIDOCAINE HYDROCHLORIDE 80 MG: 20 INJECTION, SOLUTION INTRAVENOUS at 07:03

## 2023-03-06 RX ADMIN — CEFAZOLIN 2 G: 1 INJECTION, POWDER, FOR SOLUTION INTRAMUSCULAR; INTRAVENOUS; PARENTERAL at 07:03

## 2023-03-06 RX ADMIN — HYDROMORPHONE HYDROCHLORIDE 1 MG: 2 INJECTION, SOLUTION INTRAMUSCULAR; INTRAVENOUS; SUBCUTANEOUS at 08:03

## 2023-03-06 RX ADMIN — HEPARIN SODIUM 5000 UNITS: 5000 INJECTION, SOLUTION INTRAVENOUS; SUBCUTANEOUS at 07:03

## 2023-03-06 RX ADMIN — SUGAMMADEX 200 MG: 100 INJECTION, SOLUTION INTRAVENOUS at 10:03

## 2023-03-06 NOTE — ANESTHESIA POSTPROCEDURE EVALUATION
Anesthesia Post Evaluation    Patient: Raina Nuñez    Procedure(s) Performed: Procedure(s) (LRB):  XI ROBOTIC HYSTERECTOMY,WITH SALPINGO-OOPHORECTOMY (Bilateral)  CYSTOSCOPY (N/A)  REMOVAL, INTRAUTERINE DEVICE (N/A)    Final Anesthesia Type: general      Patient location during evaluation: PACU  Patient participation: Yes- Able to Participate  Level of consciousness: awake and sedated  Post-procedure vital signs: reviewed and stable  Pain management: adequate  Airway patency: patent    PONV status at discharge: No PONV  Anesthetic complications: no      Cardiovascular status: blood pressure returned to baseline  Respiratory status: unassisted  Hydration status: euvolemic  Follow-up not needed.          Vitals Value Taken Time   /64 03/06/23 1330   Temp 36.6 °C (97.9 °F) 03/06/23 1058   Pulse 85 03/06/23 1345   Resp 17 03/06/23 1345   SpO2 97 % 03/06/23 1345         Event Time   Out of Recovery 13:45:00         Pain/Edgard Score: Pain Rating Prior to Med Admin: 8 (3/6/2023 12:03 PM)  Pain Rating Post Med Admin: 0 (3/6/2023 12:30 PM)  Edgard Score: 8 (3/6/2023  1:30 PM)

## 2023-03-06 NOTE — ANESTHESIA PREPROCEDURE EVALUATION
03/06/2023  Raina Nuñez is a 52 y.o., female.      Pre-op Assessment    I have reviewed the Patient Summary Reports.     I have reviewed the Nursing Notes. I have reviewed the NPO Status.   I have reviewed the Medications.     Review of Systems  Anesthesia Hx:  No problems with previous Anesthesia    Social:  Non-Smoker, No Alcohol Use    Hematology/Oncology:  Hematology Normal   Oncology Normal     EENT/Dental:EENT/Dental Normal   Cardiovascular:   Hypertension    Pulmonary:  Pulmonary Normal    Renal/:  Renal/ Normal     Hepatic/GI:  Hepatic/GI Normal    Musculoskeletal:  Musculoskeletal Normal    Neurological:  Neurology Normal    Endocrine:  Endocrine Normal  Morbid Obesity / BMI > 40  Dermatological:  Skin Normal    Psych:  Psychiatric Normal           Physical Exam  General: Well nourished    Airway:  Mallampati: III / III  Mouth Opening: Normal  TM Distance: > 6 cm  Tongue: Normal  Neck ROM: Normal ROM    Chest/Lungs:  Clear to auscultation, Normal Respiratory Rate    Heart:  Rate: Normal  Rhythm: Regular Rhythm        Anesthesia Plan  Type of Anesthesia, risks & benefits discussed:    Anesthesia Type: Gen ETT  Intra-op Monitoring Plan: Standard ASA Monitors  Post Op Pain Control Plan: multimodal analgesia  Induction:  IV  Informed Consent: Informed consent signed with the Patient and all parties understand the risks and agree with anesthesia plan.  All questions answered. Patient consented to blood products? Yes  ASA Score: 3  Day of Surgery Review of History & Physical: H&P Update referred to the surgeon/provider.I have interviewed and examined the patient. I have reviewed the patient's H&P dated: There are no significant changes. H&P completed by Anesthesiologist.    Ready For Surgery From Anesthesia Perspective.     .

## 2023-03-06 NOTE — PROGRESS NOTES
Patient alert postoperatively.    Normal postoperative incisional pain.      Received IV medication earlier.    Alert tolerating liquids at present.      On examination lungs are clear abdomen soft incisions dry bowel sounds present.    Surgery again discussed.      Postoperative hematocrit pending at present.      Discussed with patient and nurse ambulation to the bathroom later for voiding.    Continue present p.o. medications.  And fluids.

## 2023-03-06 NOTE — OP NOTE
Dr. Badillo     March the 06/20/2023     Preoperative diagnosis-persistent menorrhagia with dysmenorrhea unrelieved by conservative management with Mirena IUD.  Uterine leiomyoma per ultrasound.      Postoperative diagnosis-same.      Findings-upon laparoscopic examination uterus was noted to be approximally 12 cm x 7 cm diameter.  irregular contour suggestive of uterine leiomyoma.    Procedures-removal of IUD followed by robotically assisted bilateral salpingo-oophorectomy     Cystoscopic examination revealed efflux of yellow orange urine from both ureteral orifices no evidence of bladder injury.      Surgeon Dr. Papi Badillo.      How the procedure was performed    Patient was placed under general anesthesia by anesthesiologist.    She was prepped and draped in usual manner for abdominal and vaginal surgery.  She was given 2  gms of Ancef. Time-out was called patient and procedure reviewed.  Bladder was drained with a Brdoy.  Speculum inserted.  Upper lip of the cervix grasped with a sharp tooth tenaculum.    The uterus and cervix were sounded to 12 Cm.  Cervix dilated to a 8mm Hegar dilator.    0 Vicryl stick ties were placed at 3 and 9:00 a.m. of the cervix and tagged    12 cm intrauterine manipulator with cervical ring was placed in usual manner.  Intrauterine bulb insufflated following by insufflation of the vaginal bulb.    Physician re gloved.  Attention was turned abdominally.      A 2 cm incision was made just above the umbilicus.  Abdomen was lifted upward with towel clamps.  Varies needle was inserted without difficulty.  CO2 applied at 6 L per minute  The abdomen was insufflated without difficulty.  Intra-abdominal pressure remained less than 15 mm of mercury throughout insufflation.    12 mm trocar was inserted without difficulty.  Scope inserted through sleep noted to be intra-abdominally.  No evidence of abdominal or pelvic adhesions.  1 cm incisions were made in the left upper quadrant, right upper  quadrant and right mid quadrant.  Thereafter 8 mm trocars were then inserted under direct visualization.      The de Corinne robot was then docked from  a left lateral side position. The #2 Laparoscopic arm attached to the supraumbilical port.  The #1 arm was  attached to the left upper quadrant port .  The #3 arm was attached to the right upper quadrant port and the #4 arm was attached to the right  lateral quadrant port.    Through the #1 arm tissue vessel sealer was inserted. Through the  #3 arm monopolar cautery scissors were inserted and through the #4 arm bipolar fenestrated forceps were inserted.    Instruments were then directed to the fundal aspect of the uterus under direct visualization.  I then assumed control at the console    My vaginal assistant then deviated the uterus up into the patient's right.  I  used the fenestrated forceps to lift the left ovary upward   The left infundibulopelvic ligament and vessels were grasped with the tissue vessel sealer cauterized and transected with enclosed blade.  Dissection was carried across the left  infundibulopelvic ligament and vessels.  Thereafter the round ligament was grasped with tissue vessel sealer, cauterized and transected in the midportion.  Thereafter the dissection was carried down the upper 2/3 of the broad ligament along the left side of the uterus cauterizing with tissue vessel sealer and transecting with enclosed blade.    Attention was then turned to the right side.  The right ovary was lifted upward with fenestrated forceps.  The right  infundibulopelvic ligament and vessels were grasped with the tissue vessel sealer cauterized and transected.  Dissection was then carried across the midportion of the right round ligament and down the upper 2/3 of the right broad ligament.    At this time my vaginal assistant deviated the uterus posteriorly.  Using the fenestrated forceps I lifted upward on the bladder flap peritoneum and using monopolar scissors  began developing the bladder flap anteriorly. The bladder flap peritoneum was lifted upward and the bladder flap developed by blunt dissection with the tissue vessel sealer beneath the bladder.  Dissection was carried out very carefully because of some significant scarring between the bladder and lower uterine segment from previous  section.  Once the bladder flap was bluntly dissected, the vaginal ring was easily through the vaginal mucosa anteriorly.    I then performed an anterior colpotomy cauterizing with monopolar cautery scissors along the upper inner bevel of the cervical ring.  Dissection with monopolar cautery scissors was begun in the midline and carried out to the right and left lateral sides anteriorly.    My vaginal assistant then deviated the uterus anteriorly and all instruments were placed in the posterior cul-de-sac.  I used the fenestrated forceps to push upward on the uterine fundus.  The cervical ring was easily seen posteriorly.  I then cauterized along the upper bevel of the cervical ring posteriorly beginning in the midline and dissecting to the right and left side around the ring.    I then had my assistant deviate the uterus upward and to the patient's right putting stretch on the left uterine vessels.  Using tissue vessel sealer the uterine vessels along the lower uterine segment and cervix were grasped cauterized and transected with enclosed blade. The dissection was carried down the left lateral aspect of the lower uterine segment and cervix alternating with tissue vessel sealer and monopolar cautery scissors until the cervix was completely released from its cervical attachment on the patient's left.    Attention was then turned to the patient's right as my vaginal assistant deviated the uterus to the left putting stretch on the right uterine vessels.  Uterine vessels were grasped with tissue vessel sealer cauterized and transected with enclosed blade.  Dissection was carried  down the lateral aspect of the right lower uterine segment and cervix alternating with tissue vessel sealer and monopolar cautery scissors until the cervix was completely released from its vaginal attachment on the right.    My vaginal assistant then pulled the uterus with both ovaries and fallopian tubes through the vaginal cuff.  Vaginal bulb was replaced in the abdomen re insufflated.    My abdominal assistant then removed the monopolar scissors and replace this with a ProGrasp instrument.  The tissue vessel sealer was removed and my abdominal assistant placed a suction irrigation apparatus to thoroughly irrigate and suction the vaginal cuff area.  Thereafter a 0 Vicryl suture was passed through the left upper quadrant port and needle  inserted.    I lifted upward on the bladder flap peritoneum with fenestrated bipolar forceps.  I then used the ProGrasp instrument to lift upward on the anterior aspect of the vaginal cuff.  The left lateral aspect of the vaginal cuff was then closed using interrupted number 0 Vicryl sutures.  Two sutures were placed along the left lateral aspect of the vaginal cuff.  This needle was removed as a 2nd suture was passed.  The 2nd suture was used to place interrupted sutures along the right lateral aspect of the vaginal cuff and tied manually.  Second needle was removed as a 3rd suture was passed.  The 3rd suture was used to continue closing the vaginal cuff in the midportion with interrupted sutures tied manually.    The pelvis was then thoroughly irrigated and suctioned with saline.  Examined under low pressure  Excellent hemostasis was noted.    The bladder flap peritoneum was then tacked to the posterior peritoneum across the vaginal cuff area with a figure-of-eight 0 Vicryl suture using the 3rd suture.  Prior to closure of the peritoneum FloSeal was placed over the vaginal cuff area.  The 3rd suture was then removed.  Again excellent hemostasis was noted.    Instruments  were removed and CO2 allowed to escape the camera was shut off.    I then re scrubbed, re gowned and gloved.    I  performed a cystoscopic examination.  There was no evidence of bladder injury a flux of yellow orange urine was noted through each ureteral orifice.  Patient had been given Pyridium prior to the procedure.  Brody catheter was reinserted.    Attention was then turned abdominally.  All ports were removed.  Abdominal incisions were then reapproximated with interrupted number 4-0 Vicryl subcuticular stitches.    Estimated blood loss 50 cc    She was returned recovery room in good condition.

## 2023-03-06 NOTE — OR NURSING
1055 REC'D TO PACU IN STABLE COND. PT SLEEPING, WAKES TO VOICE. CONNECTED TO BP CUFF, EKG LEADS & SPO2 MONITORS. VS STABLE. PT HAD A TOTAL HYSTERECTOMY TODAY. NO DISTRESS NOTED@ THIS TIME, WILL CONT TO MONITOR.      1150 WAITING ON A BED TO BECOME AVAILABLE.      1200 PAIN MEDS GIVEN PER REQUEST. PTS FAMILY UPDATED ON PTS STATUS.       1245 DR HERNÁNDEZ INTO PACU TO SEE PT.         1300 PT RESTING COMFORTABLY @ THIS TIME.      1345 TRANSFERRED TO ROOM  IN STABLE COND. ASSISTED ONTO HOSPITAL BED. BEDSIDE REPORT GIVEN TO BERNARDO CASAS. VS STABLE. NO DISTRESS NOTED @ THIS TIME. 97.4  100 %  89  121/71

## 2023-03-06 NOTE — ANESTHESIA PROCEDURE NOTES
Intubation    Date/Time: 3/6/2023 7:39 AM  Performed by: Ledy Morris CRNA  Authorized by: Cali Scuhmacher MD     Intubation:     Induction:  Intravenous    Intubated:  Postinduction    Mask Ventilation:  Easy mask    Attempts:  1    Attempted By:  CRNA    Method of Intubation:  Direct    Blade:  Romie 4    Laryngeal View Grade: Grade I - full view of cords      Difficult Airway Encountered?: No      Complications:  None    Airway Device:  Oral endotracheal tube    Airway Device Size:  7.0    Style/Cuff Inflation:  Cuffed    Inflation Amount (mL):  20    Tube secured:  7    Secured at:  The lips    Placement Verified By:  Capnometry    Complicating Factors:  None    Findings Post-Intubation:  BS equal bilateral and atraumatic/condition of teeth unchanged

## 2023-03-06 NOTE — TRANSFER OF CARE
"Anesthesia Transfer of Care Note    Patient: Raina Nuñez    Procedure(s) Performed: Procedure(s) (LRB):  XI ROBOTIC HYSTERECTOMY,WITH SALPINGO-OOPHORECTOMY (Bilateral)  CYSTOSCOPY (N/A)  REMOVAL, INTRAUTERINE DEVICE (N/A)    Patient location: PACU    Anesthesia Type: general    Transport from OR: Transported from OR on 6-10 L/min O2 by face mask with adequate spontaneous ventilation    Post pain: adequate analgesia    Post assessment: no apparent anesthetic complications    Post vital signs: stable    Level of consciousness: awake and responds to stimulation    Nausea/Vomiting: no nausea/vomiting    Complications: none    Transfer of care protocol was followed      Last vitals:   Visit Vitals  /70 (BP Location: Right arm, Patient Position: Lying)   Pulse 83   Temp 36.6 °C (97.9 °F) (Oral)   Resp 14   Ht 5' 9" (1.753 m)   Wt (!) 155.1 kg (342 lb)   SpO2 98%   Breastfeeding No   BMI 50.50 kg/m²     "

## 2023-03-07 VITALS
HEIGHT: 69 IN | HEART RATE: 74 BPM | DIASTOLIC BLOOD PRESSURE: 60 MMHG | BODY MASS INDEX: 43.4 KG/M2 | WEIGHT: 293 LBS | TEMPERATURE: 98 F | SYSTOLIC BLOOD PRESSURE: 122 MMHG | OXYGEN SATURATION: 98 % | RESPIRATION RATE: 18 BRPM

## 2023-03-07 DIAGNOSIS — G89.18 POSTOPERATIVE PAIN: Primary | ICD-10-CM

## 2023-03-07 LAB
BASOPHILS # BLD AUTO: 0.01 K/UL (ref 0–0.2)
BASOPHILS NFR BLD AUTO: 0.1 % (ref 0–1)
DIFFERENTIAL METHOD BLD: ABNORMAL
EOSINOPHIL # BLD AUTO: 0 K/UL (ref 0–0.5)
EOSINOPHIL NFR BLD AUTO: 0 % (ref 1–4)
ERYTHROCYTE [DISTWIDTH] IN BLOOD BY AUTOMATED COUNT: 14.6 % (ref 11.5–14.5)
ESTROGEN SERPL-MCNC: NORMAL PG/ML
HCT VFR BLD AUTO: 34.7 % (ref 38–47)
HGB BLD-MCNC: 11 G/DL (ref 12–16)
IMM GRANULOCYTES # BLD AUTO: 0.06 K/UL (ref 0–0.04)
IMM GRANULOCYTES NFR BLD: 0.4 % (ref 0–0.4)
INSULIN SERPL-ACNC: NORMAL U[IU]/ML
LAB AP GROSS DESCRIPTION: NORMAL
LAB AP LABORATORY NOTES: NORMAL
LYMPHOCYTES # BLD AUTO: 1.06 K/UL (ref 1–4.8)
LYMPHOCYTES NFR BLD AUTO: 7.9 % (ref 27–41)
MCH RBC QN AUTO: 27.7 PG (ref 27–31)
MCHC RBC AUTO-ENTMCNC: 31.7 G/DL (ref 32–36)
MCV RBC AUTO: 87.4 FL (ref 80–96)
MONOCYTES # BLD AUTO: 0.94 K/UL (ref 0–0.8)
MONOCYTES NFR BLD AUTO: 7 % (ref 2–6)
MPC BLD CALC-MCNC: 10.2 FL (ref 9.4–12.4)
NEUTROPHILS # BLD AUTO: 11.39 K/UL (ref 1.8–7.7)
NEUTROPHILS NFR BLD AUTO: 84.6 % (ref 53–65)
NRBC # BLD AUTO: 0 X10E3/UL
NRBC, AUTO (.00): 0 %
PLATELET # BLD AUTO: 252 K/UL (ref 150–400)
RBC # BLD AUTO: 3.97 M/UL (ref 4.2–5.4)
T3RU NFR SERPL: NORMAL %
WBC # BLD AUTO: 13.46 K/UL (ref 4.5–11)

## 2023-03-07 PROCEDURE — 85025 COMPLETE CBC W/AUTO DIFF WBC: CPT | Performed by: OBSTETRICS & GYNECOLOGY

## 2023-03-07 PROCEDURE — 63600175 PHARM REV CODE 636 W HCPCS: Mod: TB,JG | Performed by: OBSTETRICS & GYNECOLOGY

## 2023-03-07 PROCEDURE — 25000003 PHARM REV CODE 250: Performed by: OBSTETRICS & GYNECOLOGY

## 2023-03-07 RX ORDER — TRAMADOL HYDROCHLORIDE 50 MG/1
TABLET ORAL
Qty: 10 TABLET | Refills: 0 | Status: SHIPPED | OUTPATIENT
Start: 2023-03-07

## 2023-03-07 RX ADMIN — TRAMADOL HYDROCHLORIDE 50 MG: 50 TABLET, COATED ORAL at 02:03

## 2023-03-07 RX ADMIN — ALUMINUM HYDROXIDE AND MAGNESIUM HYDROXIDE 30 ML: 200; 200 SUSPENSION ORAL at 09:03

## 2023-03-07 RX ADMIN — ONDANSETRON HYDROCHLORIDE 4 MG: 2 SOLUTION INTRAMUSCULAR; INTRAVENOUS at 03:03

## 2023-03-07 RX ADMIN — MORPHINE SULFATE 4 MG: 4 INJECTION, SOLUTION INTRAMUSCULAR; INTRAVENOUS at 12:03

## 2023-03-07 RX ADMIN — TRAMADOL HYDROCHLORIDE 50 MG: 50 TABLET, COATED ORAL at 03:03

## 2023-03-07 RX ADMIN — FAMOTIDINE 20 MG: 20 TABLET, FILM COATED ORAL at 09:03

## 2023-03-07 RX ADMIN — TRAMADOL HYDROCHLORIDE 50 MG: 50 TABLET, COATED ORAL at 09:03

## 2023-03-07 NOTE — PROGRESS NOTES
Alert this morning.  Ambulating to the bathroom voiding without difficulty.    Tolerating liquids well.  Discussed progressing to regular diet.    Lungs are clear abdomen soft nondistended bowel sounds present.      Morning hematocrit 34.7 hemoglobin 11.0.      Discussed with patient and nurse removing IV    Progress diet as tolerated.  May shower with assistance later this morning.      If continues to do well will discharge home after lunch.   n/a

## 2023-03-07 NOTE — DISCHARGE SUMMARY
Final diagnosis-post robotic hysterectomy with bilateral salpingo-oophorectomy.      Hospital course patient admitted March 6th 2023 for robotically assisted hysterectomy with bilateral salpingo-oophorectomy.  Procedure was done without complications.  Cystoscopy postprocedure revealed efflux of yellow orange urine through both ureteral orifices.  No evidence of bladder injury.      Brody catheter was removed as she left the recovery room.  Later in the evening she began ambulating to the bathroom voiding without difficulty.  She tolerated liquids well.      IV was changed to INT line.  She did well during the night.  The next morning she was noted to be alert ambulating well without difficulty.  Voiding without difficulty.      She was progressed to a regular diet which she tolerated well.      Hematocrit 1st postoperative day 34.7 with hemoglobin 11.0.      On recheck at 1:30 p.m. she was ready for discharge home.  Abdomen remained soft bowel sounds present incisions dry.      Postoperative incisional care instructions given.  Sedentary activity instructions given.      Instructed have follow-up in my office in 2 weeks no driving prior to follow-up.    She will continue routine home meds.  Also prescribed Ultram 50 mg 1 q.6 hours p.r.n. for pain 10. Given.    Discussed Tylenol or Advil as needed for minor pain.      She was instructed to return to the emergency room if any chills fever vomiting or heavy vaginal bleeding occurs.      Otherwise follow-up in my office in 2 weeks.

## 2023-03-07 NOTE — PLAN OF CARE
Problem: Adult Inpatient Plan of Care  Goal: Plan of Care Review  Outcome: Met  Goal: Patient-Specific Goal (Individualized)  Outcome: Met  Goal: Absence of Hospital-Acquired Illness or Injury  Outcome: Met  Goal: Optimal Comfort and Wellbeing  Outcome: Met  Goal: Readiness for Transition of Care  Outcome: Met     Problem: Bariatric Environmental Safety  Goal: Safety Maintained with Care  Outcome: Met     Problem: Infection  Goal: Absence of Infection Signs and Symptoms  Outcome: Met     Problem: Fall Injury Risk  Goal: Absence of Fall and Fall-Related Injury  Outcome: Met

## 2023-03-09 ENCOUNTER — TELEPHONE (OUTPATIENT)
Dept: OBSTETRICS AND GYNECOLOGY | Facility: CLINIC | Age: 53
End: 2023-03-09
Payer: COMMERCIAL

## 2023-03-09 NOTE — TELEPHONE ENCOUNTER
Discussed benign pathology report.  Discussed uterus enlarged 267 g.    No problems at present.  She will have follow-up with me in 2 weeks as previously directed.

## 2023-03-21 ENCOUNTER — OFFICE VISIT (OUTPATIENT)
Dept: OBSTETRICS AND GYNECOLOGY | Facility: CLINIC | Age: 53
End: 2023-03-21
Payer: COMMERCIAL

## 2023-03-21 VITALS — DIASTOLIC BLOOD PRESSURE: 72 MMHG | SYSTOLIC BLOOD PRESSURE: 128 MMHG

## 2023-03-21 DIAGNOSIS — Z09 POSTOP CHECK: Primary | ICD-10-CM

## 2023-03-21 PROCEDURE — 99024 PR POST-OP FOLLOW-UP VISIT: ICD-10-PCS | Mod: ,,, | Performed by: OBSTETRICS & GYNECOLOGY

## 2023-03-21 PROCEDURE — 4010F ACE/ARB THERAPY RXD/TAKEN: CPT | Mod: ,,, | Performed by: OBSTETRICS & GYNECOLOGY

## 2023-03-21 PROCEDURE — 99213 OFFICE O/P EST LOW 20 MIN: CPT | Mod: PBBFAC | Performed by: OBSTETRICS & GYNECOLOGY

## 2023-03-21 PROCEDURE — 4010F PR ACE/ARB THEARPY RXD/TAKEN: ICD-10-PCS | Mod: ,,, | Performed by: OBSTETRICS & GYNECOLOGY

## 2023-03-21 PROCEDURE — 3074F SYST BP LT 130 MM HG: CPT | Mod: ,,, | Performed by: OBSTETRICS & GYNECOLOGY

## 2023-03-21 PROCEDURE — 3078F DIAST BP <80 MM HG: CPT | Mod: ,,, | Performed by: OBSTETRICS & GYNECOLOGY

## 2023-03-21 PROCEDURE — 3078F PR MOST RECENT DIASTOLIC BLOOD PRESSURE < 80 MM HG: ICD-10-PCS | Mod: ,,, | Performed by: OBSTETRICS & GYNECOLOGY

## 2023-03-21 PROCEDURE — 3074F PR MOST RECENT SYSTOLIC BLOOD PRESSURE < 130 MM HG: ICD-10-PCS | Mod: ,,, | Performed by: OBSTETRICS & GYNECOLOGY

## 2023-03-21 PROCEDURE — 99024 POSTOP FOLLOW-UP VISIT: CPT | Mod: ,,, | Performed by: OBSTETRICS & GYNECOLOGY

## 2023-03-21 NOTE — PATIENT INSTRUCTIONS
Discussed normal postoperative exam today.      She will notify if any problems arise.      Gradually increase activity.      Discussed no sexual relations for another 6 weeks    Follow-up with me in 2 months.

## 2023-05-23 ENCOUNTER — OFFICE VISIT (OUTPATIENT)
Dept: OBSTETRICS AND GYNECOLOGY | Facility: CLINIC | Age: 53
End: 2023-05-23
Payer: COMMERCIAL

## 2023-05-23 VITALS
HEIGHT: 69 IN | WEIGHT: 293 LBS | SYSTOLIC BLOOD PRESSURE: 130 MMHG | BODY MASS INDEX: 43.4 KG/M2 | DIASTOLIC BLOOD PRESSURE: 72 MMHG

## 2023-05-23 DIAGNOSIS — M54.50 LUMBOSACRAL PAIN: ICD-10-CM

## 2023-05-23 DIAGNOSIS — Z09 POSTOP CHECK: Primary | ICD-10-CM

## 2023-05-23 PROCEDURE — 4010F ACE/ARB THERAPY RXD/TAKEN: CPT | Mod: ,,, | Performed by: OBSTETRICS & GYNECOLOGY

## 2023-05-23 PROCEDURE — 99214 OFFICE O/P EST MOD 30 MIN: CPT | Mod: PBBFAC | Performed by: OBSTETRICS & GYNECOLOGY

## 2023-05-23 PROCEDURE — 3075F PR MOST RECENT SYSTOLIC BLOOD PRESS GE 130-139MM HG: ICD-10-PCS | Mod: ,,, | Performed by: OBSTETRICS & GYNECOLOGY

## 2023-05-23 PROCEDURE — 3075F SYST BP GE 130 - 139MM HG: CPT | Mod: ,,, | Performed by: OBSTETRICS & GYNECOLOGY

## 2023-05-23 PROCEDURE — 3078F DIAST BP <80 MM HG: CPT | Mod: ,,, | Performed by: OBSTETRICS & GYNECOLOGY

## 2023-05-23 PROCEDURE — 99024 PR POST-OP FOLLOW-UP VISIT: ICD-10-PCS | Mod: ,,, | Performed by: OBSTETRICS & GYNECOLOGY

## 2023-05-23 PROCEDURE — 1159F MED LIST DOCD IN RCRD: CPT | Mod: ,,, | Performed by: OBSTETRICS & GYNECOLOGY

## 2023-05-23 PROCEDURE — 1159F PR MEDICATION LIST DOCUMENTED IN MEDICAL RECORD: ICD-10-PCS | Mod: ,,, | Performed by: OBSTETRICS & GYNECOLOGY

## 2023-05-23 PROCEDURE — 3008F PR BODY MASS INDEX (BMI) DOCUMENTED: ICD-10-PCS | Mod: ,,, | Performed by: OBSTETRICS & GYNECOLOGY

## 2023-05-23 PROCEDURE — 3078F PR MOST RECENT DIASTOLIC BLOOD PRESSURE < 80 MM HG: ICD-10-PCS | Mod: ,,, | Performed by: OBSTETRICS & GYNECOLOGY

## 2023-05-23 PROCEDURE — 4010F PR ACE/ARB THEARPY RXD/TAKEN: ICD-10-PCS | Mod: ,,, | Performed by: OBSTETRICS & GYNECOLOGY

## 2023-05-23 PROCEDURE — 99024 POSTOP FOLLOW-UP VISIT: CPT | Mod: ,,, | Performed by: OBSTETRICS & GYNECOLOGY

## 2023-05-23 PROCEDURE — 3008F BODY MASS INDEX DOCD: CPT | Mod: ,,, | Performed by: OBSTETRICS & GYNECOLOGY

## 2023-05-23 NOTE — PATIENT INSTRUCTIONS
Discussed normal pelvic examination.  Discussed urine dipstick negative culture not done.    She does not require hormone replacement therapy.  No significant hot flashes.      Discussed lumbosacral back pain.  Consult has been placed to Dr. Pate orthopedic surgeon.      She will continue yearly screening mammography    Routine glucose cholesterol testing by primary care provider    Recheck yearly or p.r.n. if problems arise.

## 2023-05-23 NOTE — PROGRESS NOTES
Subjective:       Patient ID: Raina Nuñez is a 52 y.o. female.    Chief Complaint: Post-op Evaluation (Here for 11 week post op check after RAH/BSO- pt c/o some lower back pain, does have urinary frequency, but she does take a water pill.)    Presents 11 weeks post robotic hysterectomy with BSO.      She is had some lower lumbosacral back pain.  However this preceded her surgery.    She works as a stylist.  Pain with standing long periods.  She does have some urinary frequency but does take a diuretic  Review of Systems      Objective:      Physical Exam  Abdominal:      Comments: Abdomen soft nondistended nontender.  However there is pain noted along the lower lumbosacral back area during palpation.   Genitourinary:     Comments: External normal vault normal cuff well-healed bimanual exam revealed uterus and ovaries absent.  Bimanual exam was unremarkable.  Urethra was prepped with Betadine mini cath specimen obtained however urine screen dipstick was negative.  Therefore culture not done.            Assessment:       1. Postop check    2. Lumbosacral pain        Plan:       Patient Instructions   Discussed normal pelvic examination.  Discussed urine dipstick negative culture not done.    She does not require hormone replacement therapy.  No significant hot flashes.      Discussed lumbosacral back pain.  Consult has been placed to Dr. Pate orthopedic surgeon.      She will continue yearly screening mammography    Routine glucose cholesterol testing by primary care provider    Recheck yearly or p.r.n. if problems arise.

## 2023-06-12 ENCOUNTER — OFFICE VISIT (OUTPATIENT)
Dept: SPINE | Facility: CLINIC | Age: 53
End: 2023-06-12
Payer: COMMERCIAL

## 2023-06-12 ENCOUNTER — HOSPITAL ENCOUNTER (OUTPATIENT)
Dept: RADIOLOGY | Facility: HOSPITAL | Age: 53
Discharge: HOME | End: 2023-06-12
Attending: ORTHOPAEDIC SURGERY
Payer: COMMERCIAL

## 2023-06-12 VITALS
OXYGEN SATURATION: 100 % | DIASTOLIC BLOOD PRESSURE: 80 MMHG | WEIGHT: 293 LBS | SYSTOLIC BLOOD PRESSURE: 120 MMHG | TEMPERATURE: 97 F | BODY MASS INDEX: 43.4 KG/M2 | HEART RATE: 67 BPM | RESPIRATION RATE: 18 BRPM | HEIGHT: 69 IN

## 2023-06-12 DIAGNOSIS — M25.511 RIGHT SHOULDER PAIN, UNSPECIFIED CHRONICITY: ICD-10-CM

## 2023-06-12 DIAGNOSIS — M54.16 LUMBAR RADICULOPATHY: ICD-10-CM

## 2023-06-12 DIAGNOSIS — M54.16 LUMBAR RADICULOPATHY: Primary | ICD-10-CM

## 2023-06-12 DIAGNOSIS — M47.26 OTHER SPONDYLOSIS WITH RADICULOPATHY, LUMBAR REGION: ICD-10-CM

## 2023-06-12 DIAGNOSIS — M54.50 LUMBOSACRAL PAIN: ICD-10-CM

## 2023-06-12 PROCEDURE — 3074F PR MOST RECENT SYSTOLIC BLOOD PRESSURE < 130 MM HG: ICD-10-PCS | Mod: ,,, | Performed by: ORTHOPAEDIC SURGERY

## 2023-06-12 PROCEDURE — 72110 X-RAY EXAM L-2 SPINE 4/>VWS: CPT | Mod: 26,,, | Performed by: ORTHOPAEDIC SURGERY

## 2023-06-12 PROCEDURE — 1159F MED LIST DOCD IN RCRD: CPT | Mod: ,,, | Performed by: ORTHOPAEDIC SURGERY

## 2023-06-12 PROCEDURE — 3008F BODY MASS INDEX DOCD: CPT | Mod: ,,, | Performed by: ORTHOPAEDIC SURGERY

## 2023-06-12 PROCEDURE — 99204 PR OFFICE/OUTPT VISIT, NEW, LEVL IV, 45-59 MIN: ICD-10-PCS | Mod: S$PBB,,, | Performed by: ORTHOPAEDIC SURGERY

## 2023-06-12 PROCEDURE — 4010F PR ACE/ARB THEARPY RXD/TAKEN: ICD-10-PCS | Mod: ,,, | Performed by: ORTHOPAEDIC SURGERY

## 2023-06-12 PROCEDURE — 3079F PR MOST RECENT DIASTOLIC BLOOD PRESSURE 80-89 MM HG: ICD-10-PCS | Mod: ,,, | Performed by: ORTHOPAEDIC SURGERY

## 2023-06-12 PROCEDURE — 72110 X-RAY EXAM L-2 SPINE 4/>VWS: CPT | Mod: TC

## 2023-06-12 PROCEDURE — 3079F DIAST BP 80-89 MM HG: CPT | Mod: ,,, | Performed by: ORTHOPAEDIC SURGERY

## 2023-06-12 PROCEDURE — 3008F PR BODY MASS INDEX (BMI) DOCUMENTED: ICD-10-PCS | Mod: ,,, | Performed by: ORTHOPAEDIC SURGERY

## 2023-06-12 PROCEDURE — 99204 OFFICE O/P NEW MOD 45 MIN: CPT | Mod: S$PBB,,, | Performed by: ORTHOPAEDIC SURGERY

## 2023-06-12 PROCEDURE — 3074F SYST BP LT 130 MM HG: CPT | Mod: ,,, | Performed by: ORTHOPAEDIC SURGERY

## 2023-06-12 PROCEDURE — 4010F ACE/ARB THERAPY RXD/TAKEN: CPT | Mod: ,,, | Performed by: ORTHOPAEDIC SURGERY

## 2023-06-12 PROCEDURE — 72110 XR LUMBAR SPINE 4-5 VIEW WITH BENDING VIEWS: ICD-10-PCS | Mod: 26,,, | Performed by: ORTHOPAEDIC SURGERY

## 2023-06-12 PROCEDURE — 1159F PR MEDICATION LIST DOCUMENTED IN MEDICAL RECORD: ICD-10-PCS | Mod: ,,, | Performed by: ORTHOPAEDIC SURGERY

## 2023-06-12 PROCEDURE — 99215 OFFICE O/P EST HI 40 MIN: CPT | Mod: PBBFAC | Performed by: ORTHOPAEDIC SURGERY

## 2023-06-12 RX ORDER — MELOXICAM 7.5 MG/1
7.5 TABLET ORAL 2 TIMES DAILY
Qty: 60 TABLET | Refills: 11 | Status: SHIPPED | OUTPATIENT
Start: 2023-06-12

## 2023-06-12 NOTE — PROGRESS NOTES
MDM/time:  Greater than 45 minutes spent on this encounter including 15 minutes reviewing imaging and notes, 20 minutes with the patient, 10 minutes documentation    ASSESSMENT:  52 y.o. female with lumbar spondylosis and right shoulder pain    PLAN:  PT lumbar spine and right shoulder   Mobic 7.5mg 1 tab BID   Follow up 4 months     HPI:  52 y.o. female here for evaluation of non radiating lower back pain and right shoulder pain.  Patient reports she has had a history of back pain prior to her hysterectomy 3 months ago.  She initially thought her back pain was related to fibroids in her female issues but since her hysterectomy she has continued having lower back pain.  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  She reports increased pain with sitting or standing for any length of time.  No issues with  strength.  No issues with balance or falls.  Denies bladder or bowel incontinence.  No difficulty walking distances.  Currently taking Advil and Tylenol for pain.  No recent physical therapy.  No prior pain management.  No recent MRI.  No prior spine surgery.  Patient is nonsmoker.    IMAGING:  AP, lateral, flexion/extension views of the lumbar spine reviewed     On the AP there is normal coronal alignment.  There are 5 non-rib-bearing lumbar vertebrae.  On the lateral there is decreased lumbar lordosis.  There is spondylotic disease with decreased disc height and osteophyte formation noted.  No fractures or listhesis noted.  No instability on flexion-extension views.     Impression:  Spondylotic changes of the lumbar spine as noted above    Past Medical History:   Diagnosis Date    Hypertension      Past Surgical History:   Procedure Laterality Date    BREAST BIOPSY      BREAST SURGERY       SECTION      CYSTOSCOPY N/A 3/6/2023    Procedure: CYSTOSCOPY;  Surgeon: Bryan Badillo MD;  Location: [facility];  Service: OB/GYN;  Laterality: N/A;     HYSTEROSCOPY WITH DILATION AND CURETTAGE OF UTERUS      REMOVAL OF INTRAUTERINE DEVICE (IUD) N/A 3/6/2023    Procedure: REMOVAL, INTRAUTERINE DEVICE;  Surgeon: Bryan Badillo MD;  Location: [facility] [place];  Service: OB/GYN;  Laterality: N/A;    REPAIR OF LACERATION Bilateral 07/22/2022    Procedure: REPAIR, LACERATION, BILATERAL EARLOBES;  Surgeon: Sukumar Osuna MD;  Location: [facility] [place];  Service: ENT;  Laterality: Bilateral;    XI ROBOTIC HYSTERECTOMY, WITH SALPINGO-OOPHORECTOMY Bilateral 3/6/2023    Procedure: XI ROBOTIC HYSTERECTOMY,WITH SALPINGO-OOPHORECTOMY;  Surgeon: Bryan Badillo MD;  Location: [facility] [place];  Service: OB/GYN;  Laterality: Bilateral;     Social History     Tobacco Use    Smoking status: Never    Smokeless tobacco: Never   Substance Use Topics    Alcohol use: Yes     Comment: on rare occassions    Drug use: Never      Current Outpatient Medications   Medication Instructions    amLODIPine (NORVASC) 5 mg, Oral, Daily    desoximetasone (TOPICORT) 0.25 % cream APPLY CREAM EXTERNALLY TWICE DAILY. RUB IN GENTLY AND COMPLETELY    hydroCHLOROthiazide (HYDRODIURIL) 25 mg, Oral, Daily    MIRENA 20 mcg/24 hours (6 yrs) 52 mg IUD No dose, route, or frequency recorded.    montelukast (SINGULAIR) 10 mg, Oral, As needed (PRN)    potassium chloride SA (K-DUR,KLOR-CON) 20 MEQ tablet 20 mEq, Oral, Daily    SAXENDA 3 mg, Subcutaneous    traMADoL (ULTRAM) 50 mg tablet One tablet p.o. q.6 hours p.r.n. for pain    valsartan (DIOVAN) 80 MG tablet No dose, route, or frequency recorded.    valsartan-hydrochlorothiazide (DIOVAN-HCT) 80-12.5 mg per tablet 1 tablet, Oral, Daily        EXAM:  Constitutional  General Appearance:  Body mass index is 50.21 kg/m²., NAD  Psychiatric   Orientation: Oriented to time, oriented to place, oriented to person  Mood and Affect: Active and alert, normal mood, normal affect  Gait and Station   Appearance:  Normal gait, normal tandem gait, able to walk on toes, able to walk  on heels    LUMBAR  Musculoskeletal System   Hips: Normal appearance, no leg length discrepancy, normal motion; left, normal motion; right    Lumbar Spine                   Inspection:  Normal alignment, normal sagittal balance                  Range of motion: decreased flexion, extension, lateral bending, rotation. Pain with range of motion                  Bony Palpation of the Lumbar Spine:  No tenderness of the spinous process, no tenderness of the sacrum, no tenderness of the coccyx                  Bony Palpation of the Right Hip:  No tenderness of the iliac crest, no tenderness of the sciatic notch, no tenderness of the SI joint                  Bony Palpation of the Left Hip:  No tenderness of the iliac crest, no tenderness of the sciatic notch, no tenderness of the SI joint                  Soft Tissue Palpation on the Right:  No tenderness of the paraspinal region, no tenderness of the iliolumbar region                  Soft Tissue Palpation on the Left:  No tenderness of the paraspinal region, no tenderness of the iliolumbar region    Motor Strength   L1 Right:  Hip flexion iliopsoas 5/5    L1 Left:  Hip flexion iliopsoas 5/5              L2-L4 Right:  Knee extension quadriceps 5/5, tibialis anterior 5/5              L2-L4 Left:  Knee extension quadriceps 5/5, tibialis anterior 5/5   L5 Right:  Extensor hallucis llongus 5/5,    L5 Left:  Extensor hallucis longus 5/5,    S1 Right:  Plantar flexion gastrocnemius 5/5   S1 Left:  Plantar flexion gastrocnemius 5/5    Neurological System   Ankle Reflex Right:  normal   Ankle Reflex Left: normal   Knee Reflex Right:  normal   Knee Reflex Left:  normal   Sensation on the Right:  L2 normal, L3 normal, L4 normal, L5 normal, S1 normal   Sensation on the Left:  L2 normal, L3 normal, L4 normal, L5 normal, S1 normal              Special Test on the Right:  Seated straight leg raising test negative, no clonus of the ankle              Special Test on the Left:  Seated  straight leg raising test negative, no clonus of the ankle    Skin   Lumbosacral Spine:  Normal skin    Cardiovascular System   Arterial Pulses Right:  Posterior tibialis normal, dorsalis pedis normal   Arterial Pulses Left:  Posterior tibialis normal, dorsalis pedis normal   Edema Right: None   Edema Left:  None

## 2023-11-20 ENCOUNTER — OFFICE VISIT (OUTPATIENT)
Dept: SPINE | Facility: CLINIC | Age: 53
End: 2023-11-20
Payer: COMMERCIAL

## 2023-11-20 DIAGNOSIS — M54.16 LUMBAR RADICULOPATHY: Primary | ICD-10-CM

## 2023-11-20 DIAGNOSIS — M25.511 RIGHT SHOULDER PAIN, UNSPECIFIED CHRONICITY: ICD-10-CM

## 2023-11-20 PROCEDURE — 4010F PR ACE/ARB THEARPY RXD/TAKEN: ICD-10-PCS | Mod: S$GLB,,, | Performed by: ORTHOPAEDIC SURGERY

## 2023-11-20 PROCEDURE — 99211 OFF/OP EST MAY X REQ PHY/QHP: CPT | Mod: PBBFAC | Performed by: ORTHOPAEDIC SURGERY

## 2023-11-20 PROCEDURE — 4010F ACE/ARB THERAPY RXD/TAKEN: CPT | Mod: S$GLB,,, | Performed by: ORTHOPAEDIC SURGERY

## 2023-11-20 PROCEDURE — 99214 OFFICE O/P EST MOD 30 MIN: CPT | Mod: S$GLB,,, | Performed by: ORTHOPAEDIC SURGERY

## 2023-11-20 PROCEDURE — 99214 PR OFFICE/OUTPT VISIT, EST, LEVL IV, 30-39 MIN: ICD-10-PCS | Mod: S$GLB,,, | Performed by: ORTHOPAEDIC SURGERY

## 2023-11-20 NOTE — PROGRESS NOTES
MDM/time:  Greater than 30 minutes spent on this encounter including 10 minutes reviewing imaging and notes, 15 minutes with the patient, 5 minutes documentation    ASSESSMENT:  53 y.o. female with lumbar spondylosis and right shoulder pain    PLAN:  Not interested in injections or surgery at this time.  Follow-up as needed     HPI:  53 y.o. female here for repeat evaluation of non radiating lower back pain and right shoulder pain.  Reports her pain is about the same.  She is completed physical therapy for lumbar spine and right shoulder with some short-term relief.  She is not taking the Mobic.  Patient reports she has had a history of back pain prior to her hysterectomy 3 months ago.  She initially thought her back pain was related to fibroids in her female issues but since her hysterectomy she has continued having lower back pain.  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  She reports increased pain with sitting or standing for any length of time.  No issues with  strength.  No issues with balance or falls.  Denies bladder or bowel incontinence.  No difficulty walking distances.  Currently taking Advil and Tylenol for pain.  No prior pain management.  No recent MRI.  No prior spine surgery.  Patient is nonsmoker.    IMAGING:  X-rays lumbar spine reviewed show:  On the AP there is normal coronal alignment.  There are 5 non-rib-bearing lumbar vertebrae.  On the lateral there is decreased lumbar lordosis.  There is spondylotic disease with decreased disc height and osteophyte formation noted.  No fractures or listhesis noted.  No instability on flexion-extension views.      Past Medical History:   Diagnosis Date    Hypertension      Past Surgical History:   Procedure Laterality Date    BREAST BIOPSY      BREAST SURGERY       SECTION      CYSTOSCOPY N/A 3/6/2023    Procedure: CYSTOSCOPY;  Surgeon: Bryan Badillo MD;  Location: [facility];  Service:  OB/GYN;  Laterality: N/A;    HYSTEROSCOPY WITH DILATION AND CURETTAGE OF UTERUS      REMOVAL OF INTRAUTERINE DEVICE (IUD) N/A 3/6/2023    Procedure: REMOVAL, INTRAUTERINE DEVICE;  Surgeon: Bryan Badillo MD;  Location: [facility];  Service: OB/GYN;  Laterality: N/A;    REPAIR OF LACERATION Bilateral 07/22/2022    Procedure: REPAIR, LACERATION, BILATERAL EARLOBES;  Surgeon: Sukumar Osuna MD;  Location: [facility];  Service: ENT;  Laterality: Bilateral;    XI ROBOTIC HYSTERECTOMY, WITH SALPINGO-OOPHORECTOMY Bilateral 3/6/2023    Procedure: XI ROBOTIC HYSTERECTOMY,WITH SALPINGO-OOPHORECTOMY;  Surgeon: Bryan Badillo MD;  Location: [facility];  Service: OB/GYN;  Laterality: Bilateral;     Social History     Tobacco Use    Smoking status: Never    Smokeless tobacco: Never   Substance Use Topics    Alcohol use: Yes     Comment: on rare occassions    Drug use: Never      Current Outpatient Medications   Medication Instructions    amLODIPine (NORVASC) 5 mg, Oral, Daily    desoximetasone (TOPICORT) 0.25 % cream APPLY CREAM EXTERNALLY TWICE DAILY. RUB IN GENTLY AND COMPLETELY    hydroCHLOROthiazide (HYDRODIURIL) 25 mg, Oral, Daily    meloxicam (MOBIC) 7.5 mg, Oral, 2 times daily    montelukast (SINGULAIR) 10 mg, Oral, As needed (PRN)    potassium chloride SA (K-DUR,KLOR-CON) 20 MEQ tablet 20 mEq, Oral, Daily    traMADoL (ULTRAM) 50 mg tablet One tablet p.o. q.6 hours p.r.n. for pain    valsartan (DIOVAN) 80 MG tablet No dose, route, or frequency recorded.    valsartan-hydrochlorothiazide (DIOVAN-HCT) 80-12.5 mg per tablet 1 tablet, Oral, Daily        EXAM:  Constitutional  General Appearance:  There is no height or weight on file to calculate BMI., NAD  Psychiatric   Orientation: Oriented to time, oriented to place, oriented to person  Mood and Affect: Active and alert, normal mood, normal affect  Gait and Station   Appearance:  Normal gait, normal tandem gait, able to walk on toes, able to walk on  heels    LUMBAR  Musculoskeletal System   Hips: Normal appearance, no leg length discrepancy, normal motion; left, normal motion; right    Lumbar Spine                   Inspection:  Normal alignment, normal sagittal balance                  Range of motion: decreased flexion, extension, lateral bending, rotation. Pain with range of motion                  Bony Palpation of the Lumbar Spine:  No tenderness of the spinous process, no tenderness of the sacrum, no tenderness of the coccyx                  Bony Palpation of the Right Hip:  No tenderness of the iliac crest, no tenderness of the sciatic notch, no tenderness of the SI joint                  Bony Palpation of the Left Hip:  No tenderness of the iliac crest, no tenderness of the sciatic notch, no tenderness of the SI joint                  Soft Tissue Palpation on the Right:  No tenderness of the paraspinal region, no tenderness of the iliolumbar region                  Soft Tissue Palpation on the Left:  No tenderness of the paraspinal region, no tenderness of the iliolumbar region    Motor Strength   L1 Right:  Hip flexion iliopsoas 5/5    L1 Left:  Hip flexion iliopsoas 5/5              L2-L4 Right:  Knee extension quadriceps 5/5, tibialis anterior 5/5              L2-L4 Left:  Knee extension quadriceps 5/5, tibialis anterior 5/5   L5 Right:  Extensor hallucis llongus 5/5,    L5 Left:  Extensor hallucis longus 5/5,    S1 Right:  Plantar flexion gastrocnemius 5/5   S1 Left:  Plantar flexion gastrocnemius 5/5    Neurological System   Ankle Reflex Right:  normal   Ankle Reflex Left: normal   Knee Reflex Right:  normal   Knee Reflex Left:  normal   Sensation on the Right:  L2 normal, L3 normal, L4 normal, L5 normal, S1 normal   Sensation on the Left:  L2 normal, L3 normal, L4 normal, L5 normal, S1 normal              Special Test on the Right:  Seated straight leg raising test negative, no clonus of the ankle              Special Test on the Left:  Seated  straight leg raising test negative, no clonus of the ankle    Skin   Lumbosacral Spine:  Normal skin    Cardiovascular System   Arterial Pulses Right:  Posterior tibialis normal, dorsalis pedis normal   Arterial Pulses Left:  Posterior tibialis normal, dorsalis pedis normal   Edema Right: None   Edema Left:  None

## 2023-12-20 ENCOUNTER — HOSPITAL ENCOUNTER (OUTPATIENT)
Dept: RADIOLOGY | Facility: HOSPITAL | Age: 53
Discharge: HOME | End: 2023-12-20
Attending: OBSTETRICS & GYNECOLOGY
Payer: COMMERCIAL

## 2023-12-20 VITALS — WEIGHT: 293 LBS | BODY MASS INDEX: 47.26 KG/M2

## 2023-12-20 DIAGNOSIS — Z12.31 OTHER SCREENING MAMMOGRAM: ICD-10-CM

## 2023-12-20 PROCEDURE — 77067 SCR MAMMO BI INCL CAD: CPT | Mod: TC

## 2023-12-21 ENCOUNTER — HOSPITAL ENCOUNTER (OUTPATIENT)
Dept: RADIOLOGY | Facility: HOSPITAL | Age: 53
Discharge: HOME | End: 2023-12-21
Attending: STUDENT IN AN ORGANIZED HEALTH CARE EDUCATION/TRAINING PROGRAM
Payer: COMMERCIAL

## 2023-12-21 DIAGNOSIS — R92.8 ABNORMAL MAMMOGRAM: ICD-10-CM

## 2023-12-21 DIAGNOSIS — R92.8 ABNORMAL FINDING ON MAMMOGRAPHY: ICD-10-CM

## 2023-12-21 PROCEDURE — A4648 IMPLANTABLE TISSUE MARKER: HCPCS

## 2023-12-21 PROCEDURE — 88305 SURGICAL PATHOLOGY: ICD-10-PCS | Mod: 26,,, | Performed by: PATHOLOGY

## 2023-12-21 PROCEDURE — 88342 IMHCHEM/IMCYTCHM 1ST ANTB: CPT | Mod: 26,,, | Performed by: PATHOLOGY

## 2023-12-21 PROCEDURE — 76642 ULTRASOUND BREAST LIMITED: CPT | Mod: TC,LT

## 2023-12-21 PROCEDURE — 88341 IMHCHEM/IMCYTCHM EA ADD ANTB: CPT | Mod: 26,,, | Performed by: PATHOLOGY

## 2023-12-21 PROCEDURE — 88341 SURGICAL PATHOLOGY: ICD-10-PCS | Mod: 26,,, | Performed by: PATHOLOGY

## 2023-12-21 PROCEDURE — 77065 DX MAMMO INCL CAD UNI: CPT | Mod: TC,LT

## 2023-12-21 PROCEDURE — 88305 TISSUE EXAM BY PATHOLOGIST: CPT | Mod: 26,,, | Performed by: PATHOLOGY

## 2023-12-21 PROCEDURE — 88305 TISSUE EXAM BY PATHOLOGIST: CPT | Mod: TC,SUR | Performed by: STUDENT IN AN ORGANIZED HEALTH CARE EDUCATION/TRAINING PROGRAM

## 2023-12-21 PROCEDURE — 88342 SURGICAL PATHOLOGY: ICD-10-PCS | Mod: 26,,, | Performed by: PATHOLOGY

## 2023-12-21 RX ORDER — LIDOCAINE HYDROCHLORIDE 10 MG/ML
10 INJECTION INFILTRATION; PERINEURAL ONCE
Status: DISCONTINUED | OUTPATIENT
Start: 2023-12-21 | End: 2023-12-22 | Stop reason: HOSPADM

## 2023-12-22 ENCOUNTER — TELEPHONE (OUTPATIENT)
Dept: OBSTETRICS AND GYNECOLOGY | Facility: CLINIC | Age: 53
End: 2023-12-22
Payer: COMMERCIAL

## 2023-12-22 LAB
DHEA SERPL-MCNC: NORMAL
ESTROGEN SERPL-MCNC: NORMAL PG/ML
INSULIN SERPL-ACNC: NORMAL U[IU]/ML
LAB AP CLINICAL INFORMATION: NORMAL
LAB AP GROSS DESCRIPTION: NORMAL
LAB AP LABORATORY NOTES: NORMAL
LAB AP PREDICTIVE MARKER TESTING: NORMAL
T3RU NFR SERPL: NORMAL %

## 2023-12-22 NOTE — TELEPHONE ENCOUNTER
I called patient regarding mammography report stating additional imaging was needed of the left breast.  This was scheduled for December the 26.  However upon calling her today she states a biopsy was actually done yesterday.  Results are pending.      I asked her to keep me informed of biopsy results.    We discussed that if general surgeon is needed we will discuss this later.    She will follow-up as directed by radiologist who performed biopsy.

## 2024-01-01 ENCOUNTER — TELEPHONE (OUTPATIENT)
Dept: OBSTETRICS AND GYNECOLOGY | Facility: CLINIC | Age: 54
End: 2024-01-01
Payer: COMMERCIAL

## 2024-01-01 NOTE — TELEPHONE ENCOUNTER
Patient has been notified that her biopsy was benign.  She has discussed this with Dr. Parvez Chapman.  He recommends a follow-up mammography in 6 months.  She states she has appointment already scheduled.  She will have follow-up as directed.    She will continue routine gyn checks with me.

## 2024-06-24 ENCOUNTER — HOSPITAL ENCOUNTER (OUTPATIENT)
Dept: RADIOLOGY | Facility: HOSPITAL | Age: 54
Discharge: HOME | End: 2024-06-24
Attending: RADIOLOGY
Payer: COMMERCIAL

## 2024-06-24 DIAGNOSIS — R92.8 ABNORMAL MAMMOGRAM: ICD-10-CM

## 2024-06-24 PROCEDURE — 77061 BREAST TOMOSYNTHESIS UNI: CPT | Mod: TC,LT

## 2024-08-08 DIAGNOSIS — M25.561 RIGHT KNEE PAIN, UNSPECIFIED CHRONICITY: Primary | ICD-10-CM

## 2024-08-12 ENCOUNTER — HOSPITAL ENCOUNTER (OUTPATIENT)
Dept: RADIOLOGY | Facility: HOSPITAL | Age: 54
Discharge: HOME | End: 2024-08-12
Attending: ORTHOPAEDIC SURGERY
Payer: COMMERCIAL

## 2024-08-12 ENCOUNTER — OFFICE VISIT (OUTPATIENT)
Dept: ORTHOPEDICS | Facility: CLINIC | Age: 54
End: 2024-08-12
Payer: COMMERCIAL

## 2024-08-12 DIAGNOSIS — M25.561 RIGHT KNEE PAIN, UNSPECIFIED CHRONICITY: ICD-10-CM

## 2024-08-12 DIAGNOSIS — M25.561 RIGHT KNEE PAIN, UNSPECIFIED CHRONICITY: Primary | ICD-10-CM

## 2024-08-12 DIAGNOSIS — M23.91 INTERNAL DERANGEMENT OF RIGHT KNEE: ICD-10-CM

## 2024-08-12 PROCEDURE — 4010F ACE/ARB THERAPY RXD/TAKEN: CPT | Mod: S$GLB,,, | Performed by: ORTHOPAEDIC SURGERY

## 2024-08-12 PROCEDURE — 99204 OFFICE O/P NEW MOD 45 MIN: CPT | Mod: S$GLB,,, | Performed by: ORTHOPAEDIC SURGERY

## 2024-08-12 PROCEDURE — 73564 X-RAY EXAM KNEE 4 OR MORE: CPT | Mod: 26,RT,, | Performed by: ORTHOPAEDIC SURGERY

## 2024-08-12 PROCEDURE — 73564 X-RAY EXAM KNEE 4 OR MORE: CPT | Mod: TC,RT

## 2024-08-12 PROCEDURE — 1159F MED LIST DOCD IN RCRD: CPT | Mod: S$GLB,,, | Performed by: ORTHOPAEDIC SURGERY

## 2024-08-12 PROCEDURE — 99999 PR PBB SHADOW E&M-EST. PATIENT-LVL III: CPT | Mod: PBBFAC,,, | Performed by: ORTHOPAEDIC SURGERY

## 2024-08-12 NOTE — PATIENT INSTRUCTIONS
MRI right knee scheduled at [facility] on 8/23/24 at 7:00 a.m.. Return appointment with Dr Schultz for results is on 8/26/24 at 2:20 p.m..

## 2024-08-12 NOTE — PROGRESS NOTES
Clinic Note        CC:   Chief Complaint   Patient presents with    Right Knee - Pain        Principal problem: Right knee pain, unspecified chronicity [M25.561]     REASON FOR VISIT:       HISTORY:  54-year-old female complaining of right knee pain she has some mechanical symptoms that is worse after she has been sitting with the knee bent he will be stiff sore she has some mechanical symptoms catching she denies any numbness or tingling he has had several twisting injuries recently      PAST MEDICAL HISTORY:   Past Medical History:   Diagnosis Date    Hypertension           PAST SURGICAL HISTORY:   Past Surgical History:   Procedure Laterality Date    BREAST BIOPSY      BREAST SURGERY       SECTION      CYSTOSCOPY N/A 2023    Procedure: CYSTOSCOPY;  Surgeon: Bryan Badillo MD;  Location: [facility];  Service: OB/GYN;  Laterality: N/A;    HYSTERECTOMY      HYSTEROSCOPY WITH DILATION AND CURETTAGE OF UTERUS      OOPHORECTOMY      REMOVAL OF INTRAUTERINE DEVICE (IUD) N/A 2023    Procedure: REMOVAL, INTRAUTERINE DEVICE;  Surgeon: Bryan Badillo MD;  Location: [facility];  Service: OB/GYN;  Laterality: N/A;    REPAIR OF LACERATION Bilateral 2022    Procedure: REPAIR, LACERATION, BILATERAL EARLOBES;  Surgeon: Sukumar Osuna MD;  Location: [facility];  Service: ENT;  Laterality: Bilateral;    XI ROBOTIC HYSTERECTOMY, WITH SALPINGO-OOPHORECTOMY Bilateral 2023    Procedure: XI ROBOTIC HYSTERECTOMY,WITH SALPINGO-OOPHORECTOMY;  Surgeon: Bryan Badillo MD;  Location: [facility];  Service: OB/GYN;  Laterality: Bilateral;          ALLERGIES: Review of patient's allergies indicates:  No Known Allergies     MEDICATIONS :    Current Outpatient Medications:     amLODIPine (NORVASC) 5 MG tablet, Take 5 mg by mouth once daily., Disp: , Rfl:     desoximetasone (TOPICORT) 0.25 % cream, APPLY CREAM EXTERNALLY TWICE DAILY. RUB IN GENTLY AND COMPLETELY, Disp: , Rfl:      hydroCHLOROthiazide (HYDRODIURIL) 25 MG tablet, Take 25 mg by mouth once daily., Disp: , Rfl:     meloxicam (MOBIC) 7.5 MG tablet, Take 1 tablet (7.5 mg total) by mouth 2 (two) times a day., Disp: 60 tablet, Rfl: 11    montelukast (SINGULAIR) 10 mg tablet, Take 10 mg by mouth as needed., Disp: , Rfl:     traMADoL (ULTRAM) 50 mg tablet, One tablet p.o. q.6 hours p.r.n. for pain, Disp: 10 tablet, Rfl: 0    valsartan (DIOVAN) 80 MG tablet, , Disp: , Rfl:     valsartan-hydrochlorothiazide (DIOVAN-HCT) 80-12.5 mg per tablet, Take 1 tablet by mouth once daily., Disp: , Rfl:      SOCIAL HISTORY:   Social History     Socioeconomic History    Marital status:    Tobacco Use    Smoking status: Never    Smokeless tobacco: Never   Substance and Sexual Activity    Alcohol use: Yes     Comment: on rare occassions    Drug use: Never    Sexual activity: Yes     Partners: Male     Birth control/protection: I.U.D.          FAMILY HISTORY:   Family History   Problem Relation Name Age of Onset    Hypertension Maternal Grandmother Carroll Nuñez     Cancer Maternal Grandmother Carroll Nuñez     Hypertension Mother Roxann Tello     Breast cancer Mother Roxann Tello           PHYSICAL EXAM:  In general, this is a well-developed, well-nourished female . The patient is alert, oriented and cooperative.      HEENT:  Normocephalic, atraumatic.  Extraocular movements are intact bilaterally.  The oropharynx is benign.       NECK:  Nontender with good range of motion.      LUNGS:  Clear to auscultation bilaterally.      HEART:  Demonstrates a regular rate and rhythm.  No murmurs appreciated.      ABDOMEN:  Soft, non-tender, non-distended.        EXTREMITIES:  Right lower extremity she distally has motor function 5/5 sensation to touch has palpable pulses.  Tender palpation over lateral joint line more so than medially.  Pain on flexion past 90°.  Slight effusion noted.  No instability on varus valgus stress anterior posterior  drawer testing.      RADIOGRAPHIC FINDINGS:  X-rays show some degenerative changes mild to moderate of the right knee no fractures      IMPRESSION: Right knee pain, unspecified chronicity         PLAN:  This time I feel like patient has a possible lateral meniscus tear.  Her symptoms consistent with this.  She has been taken Mobic not getting relief the symptoms.  She had been doing strengthening exercises for the knee at home.  At this time we will get an MRI of her right knee.  I will follow back up after the MRI.  We did discuss possible arthroscopic intervention if she has a tear.  There are no Patient Instructions on file for this visit.      No follow-ups on file.         Charanjit Schultz      (Subject to voice recognition error, transcription service not allowed)

## 2024-08-12 NOTE — PROGRESS NOTES
Radiology Interpretation        Patient Name: Raina Nuñez  Date: 8/12/2024  YOB: 1970  MRN# [id number]        ORDERING DIAGNOSIS:    Encounter Diagnosis   Name Primary?    Right knee pain, unspecified chronicity Yes           Four views right knee skeletally mature individual there is some mild degenerative changes noted over the medial compartment there were no fractures no bony lesions impression degenerative changes more involved medial compartment right knee no fractures or subluxations            Charanjit Schultz MD

## 2024-08-29 ENCOUNTER — TELEPHONE (OUTPATIENT)
Dept: ORTHOPEDICS | Facility: CLINIC | Age: 54
End: 2024-08-29
Payer: COMMERCIAL

## 2024-08-30 ENCOUNTER — OFFICE VISIT (OUTPATIENT)
Dept: ORTHOPEDICS | Facility: CLINIC | Age: 54
End: 2024-08-30
Payer: COMMERCIAL

## 2024-08-30 DIAGNOSIS — M17.11 ARTHRITIS OF RIGHT KNEE: Primary | ICD-10-CM

## 2024-08-30 PROCEDURE — 99999 PR PBB SHADOW E&M-EST. PATIENT-LVL III: CPT | Mod: PBBFAC,,, | Performed by: ORTHOPAEDIC SURGERY

## 2024-08-30 RX ORDER — BUPIVACAINE HYDROCHLORIDE 2.5 MG/ML
1 INJECTION, SOLUTION EPIDURAL; INFILTRATION; INTRACAUDAL
Status: DISCONTINUED | OUTPATIENT
Start: 2024-08-30 | End: 2024-08-30 | Stop reason: HOSPADM

## 2024-08-30 RX ORDER — TRIAMCINOLONE ACETONIDE 40 MG/ML
40 INJECTION, SUSPENSION INTRA-ARTICULAR; INTRAMUSCULAR
Status: DISCONTINUED | OUTPATIENT
Start: 2024-08-30 | End: 2024-08-30 | Stop reason: HOSPADM

## 2024-08-30 RX ADMIN — BUPIVACAINE HYDROCHLORIDE 1 ML: 2.5 INJECTION, SOLUTION EPIDURAL; INFILTRATION; INTRACAUDAL at 10:08

## 2024-08-30 RX ADMIN — TRIAMCINOLONE ACETONIDE 40 MG: 40 INJECTION, SUSPENSION INTRA-ARTICULAR; INTRAMUSCULAR at 10:08

## 2024-08-30 NOTE — PROGRESS NOTES
Patient is here follow-up of her right MRI of the knee.  She has some mild arthritic changes to moderate.  No tears.  We discussed treatment options.  I injected her knee with 1 cc of Marcaine 1 cc Kenalog.  Follow-up in 3 months.

## 2024-08-30 NOTE — PROCEDURES
Large Joint Aspiration/Injection: R knee    Date/Time: 8/30/2024 10:30 AM    Performed by: Charanjit Schultz MD  Authorized by: Charanjit Schultz MD    Consent Done?:  Yes (Verbal)  Indications:  Arthritis    Details:  Needle Size:  22 G  Ultrasonic Guidance for needle placement?: No    Approach:  Anterolateral  Location:  Knee  Site:  R knee  Medications:  1 mL BUPivacaine (PF) 0.25% (2.5 mg/ml) 0.25 % (2.5 mg/mL); 40 mg triamcinolone acetonide 40 mg/mL  Patient tolerance:  Patient tolerated the procedure well with no immediate complications

## 2024-11-20 ENCOUNTER — HOSPITAL ENCOUNTER (OUTPATIENT)
Dept: RADIOLOGY | Facility: HOSPITAL | Age: 54
Discharge: HOME | End: 2024-11-20
Attending: OBSTETRICS & GYNECOLOGY
Payer: COMMERCIAL

## 2024-11-20 VITALS — HEIGHT: 69 IN | WEIGHT: 293 LBS | BODY MASS INDEX: 43.4 KG/M2

## 2024-11-20 DIAGNOSIS — Z12.31 OTHER SCREENING MAMMOGRAM: ICD-10-CM

## 2024-11-20 PROCEDURE — 77066 DX MAMMO INCL CAD BI: CPT | Mod: TC

## 2024-12-13 ENCOUNTER — TELEPHONE (OUTPATIENT)
Dept: ORTHOPEDICS | Facility: CLINIC | Age: 54
End: 2024-12-13
Payer: COMMERCIAL

## 2024-12-13 NOTE — TELEPHONE ENCOUNTER
----- Message from Juli sent at 12/12/2024 11:56 AM CST -----  Regarding: sooner appt for injection  Who Called: Raina Nuñez    Caller is requesting a sooner appointment. Caller declined first available appointment listed below. Caller will not accept being placed on the waitlist and is requesting a message be sent to doctor.    When is the first available appointment?01/22    Symptoms:Injection in R Knee      Preferred Method of Contact: Phone Call  Patient's Preferred Phone Number on File: [phone]   Best Call Back Number, if different:  Additional Information:

## 2024-12-30 ENCOUNTER — OFFICE VISIT (OUTPATIENT)
Dept: ORTHOPEDICS | Facility: CLINIC | Age: 54
End: 2024-12-30
Payer: COMMERCIAL

## 2024-12-30 VITALS
WEIGHT: 256.69 LBS | BODY MASS INDEX: 38.02 KG/M2 | OXYGEN SATURATION: 99 % | DIASTOLIC BLOOD PRESSURE: 64 MMHG | HEART RATE: 72 BPM | HEIGHT: 69 IN | SYSTOLIC BLOOD PRESSURE: 148 MMHG

## 2024-12-30 DIAGNOSIS — M25.561 RIGHT KNEE PAIN, UNSPECIFIED CHRONICITY: Primary | ICD-10-CM

## 2024-12-30 PROCEDURE — 3078F DIAST BP <80 MM HG: CPT | Mod: S$GLB,,, | Performed by: ORTHOPAEDIC SURGERY

## 2024-12-30 PROCEDURE — 4010F ACE/ARB THERAPY RXD/TAKEN: CPT | Mod: S$GLB,,, | Performed by: ORTHOPAEDIC SURGERY

## 2024-12-30 PROCEDURE — 99999 PR PBB SHADOW E&M-EST. PATIENT-LVL III: CPT | Mod: PBBFAC,,, | Performed by: ORTHOPAEDIC SURGERY

## 2024-12-30 PROCEDURE — 99213 OFFICE O/P EST LOW 20 MIN: CPT | Mod: 25,S$GLB,, | Performed by: ORTHOPAEDIC SURGERY

## 2024-12-30 PROCEDURE — 20610 DRAIN/INJ JOINT/BURSA W/O US: CPT | Mod: RT,S$GLB,, | Performed by: ORTHOPAEDIC SURGERY

## 2024-12-30 PROCEDURE — 3008F BODY MASS INDEX DOCD: CPT | Mod: S$GLB,,, | Performed by: ORTHOPAEDIC SURGERY

## 2024-12-30 PROCEDURE — 3077F SYST BP >= 140 MM HG: CPT | Mod: S$GLB,,, | Performed by: ORTHOPAEDIC SURGERY

## 2024-12-30 PROCEDURE — 1159F MED LIST DOCD IN RCRD: CPT | Mod: S$GLB,,, | Performed by: ORTHOPAEDIC SURGERY

## 2024-12-30 RX ORDER — BUPIVACAINE HYDROCHLORIDE 2.5 MG/ML
1 INJECTION, SOLUTION EPIDURAL; INFILTRATION; INTRACAUDAL
Status: DISCONTINUED | OUTPATIENT
Start: 2024-12-30 | End: 2024-12-30 | Stop reason: HOSPADM

## 2024-12-30 RX ORDER — TRIAMCINOLONE ACETONIDE 40 MG/ML
40 INJECTION, SUSPENSION INTRA-ARTICULAR; INTRAMUSCULAR
Status: DISCONTINUED | OUTPATIENT
Start: 2024-12-30 | End: 2024-12-30 | Stop reason: HOSPADM

## 2024-12-30 RX ADMIN — BUPIVACAINE HYDROCHLORIDE 1 ML: 2.5 INJECTION, SOLUTION EPIDURAL; INFILTRATION; INTRACAUDAL at 10:12

## 2024-12-30 RX ADMIN — TRIAMCINOLONE ACETONIDE 40 MG: 40 INJECTION, SUSPENSION INTRA-ARTICULAR; INTRAMUSCULAR at 10:12

## 2024-12-30 NOTE — PROCEDURES
Large Joint Aspiration/Injection: R knee    Date/Time: 12/30/2024 10:30 AM    Performed by: Charanjit Schultz MD  Authorized by: Charanjit Schultz MD    Consent Done?:  Yes (Verbal)  Indications:  Arthritis    Details:  Needle Size:  22 G  Ultrasonic Guidance for needle placement?: No    Approach:  Anterolateral  Location:  Knee  Site:  R knee  Medications:  1 mL BUPivacaine (PF) 0.25% (2.5 mg/ml) 0.25 % (2.5 mg/mL); 40 mg triamcinolone acetonide 40 mg/mL  Patient tolerance:  Patient tolerated the procedure well with no immediate complications

## 2024-12-30 NOTE — PROGRESS NOTES
Patient is here for right knee arthritic changes.  The previous injection helped with the pain.  She wished another injection.  I injected her right knee with 1 cc Marcaine 1 cc Kenalog.  Let her weightbear as tolerates.  I will follow up in 3 months.

## 2025-05-02 ENCOUNTER — TELEPHONE (OUTPATIENT)
Dept: ORTHOPEDICS | Facility: CLINIC | Age: 55
End: 2025-05-02
Payer: COMMERCIAL

## 2025-05-06 ENCOUNTER — OFFICE VISIT (OUTPATIENT)
Dept: ORTHOPEDICS | Facility: CLINIC | Age: 55
End: 2025-05-06
Payer: COMMERCIAL

## 2025-05-06 VITALS
HEIGHT: 69 IN | BODY MASS INDEX: 43.4 KG/M2 | DIASTOLIC BLOOD PRESSURE: 76 MMHG | RESPIRATION RATE: 18 BRPM | SYSTOLIC BLOOD PRESSURE: 140 MMHG | OXYGEN SATURATION: 98 % | HEART RATE: 71 BPM | WEIGHT: 293 LBS

## 2025-05-06 DIAGNOSIS — M17.11 ARTHRITIS OF RIGHT KNEE: Primary | ICD-10-CM

## 2025-05-06 PROCEDURE — 20610 DRAIN/INJ JOINT/BURSA W/O US: CPT | Mod: RT,S$GLB,, | Performed by: NURSE PRACTITIONER

## 2025-05-06 PROCEDURE — 3078F DIAST BP <80 MM HG: CPT | Mod: S$GLB,,, | Performed by: NURSE PRACTITIONER

## 2025-05-06 PROCEDURE — 4010F ACE/ARB THERAPY RXD/TAKEN: CPT | Mod: S$GLB,,, | Performed by: NURSE PRACTITIONER

## 2025-05-06 PROCEDURE — 3008F BODY MASS INDEX DOCD: CPT | Mod: S$GLB,,, | Performed by: NURSE PRACTITIONER

## 2025-05-06 PROCEDURE — 3075F SYST BP GE 130 - 139MM HG: CPT | Mod: S$GLB,,, | Performed by: NURSE PRACTITIONER

## 2025-05-06 PROCEDURE — 99999 PR PBB SHADOW E&M-EST. PATIENT-LVL IV: CPT | Mod: PBBFAC,,, | Performed by: NURSE PRACTITIONER

## 2025-05-06 PROCEDURE — 1159F MED LIST DOCD IN RCRD: CPT | Mod: S$GLB,,, | Performed by: NURSE PRACTITIONER

## 2025-05-06 PROCEDURE — 99213 OFFICE O/P EST LOW 20 MIN: CPT | Mod: 25,S$GLB,, | Performed by: NURSE PRACTITIONER

## 2025-05-06 RX ORDER — TRIAMCINOLONE ACETONIDE 40 MG/ML
40 INJECTION, SUSPENSION INTRA-ARTICULAR; INTRAMUSCULAR
Status: DISCONTINUED | OUTPATIENT
Start: 2025-05-06 | End: 2025-05-06 | Stop reason: HOSPADM

## 2025-05-06 RX ADMIN — TRIAMCINOLONE ACETONIDE 40 MG: 40 INJECTION, SUSPENSION INTRA-ARTICULAR; INTRAMUSCULAR at 02:05

## 2025-05-06 NOTE — PROGRESS NOTES
"  CC:  Knee pain    54 y.o. Female returns to clinic for a follow up visit regarding right knee pain.       Patient has had injections in the past with the last being 2024 by Dr. Schultz.   She reports she is just now starting to have pain again.   She is going out of town soon and would like  a repeat injection today.     She states she has been having a sharp, pulling pain behind her left knee that just started within the last couple of weeks.        Past Medical History:   Diagnosis Date    Hypertension      Past Surgical History:   Procedure Laterality Date    BREAST BIOPSY      BREAST SURGERY       SECTION      CYSTOSCOPY N/A 2023    Procedure: CYSTOSCOPY;  Surgeon: Bryan Badillo MD;  Location: [facility];  Service: OB/GYN;  Laterality: N/A;    HYSTERECTOMY      HYSTEROSCOPY WITH DILATION AND CURETTAGE OF UTERUS      OOPHORECTOMY      REMOVAL OF INTRAUTERINE DEVICE (IUD) N/A 2023    Procedure: REMOVAL, INTRAUTERINE DEVICE;  Surgeon: Bryan Badillo MD;  Location: [facility];  Service: OB/GYN;  Laterality: N/A;    REPAIR OF LACERATION Bilateral 2022    Procedure: REPAIR, LACERATION, BILATERAL EARLOBES;  Surgeon: Sukumar Osuna MD;  Location: [facility];  Service: ENT;  Laterality: Bilateral;    XI ROBOTIC HYSTERECTOMY, WITH SALPINGO-OOPHORECTOMY Bilateral 2023    Procedure: XI ROBOTIC HYSTERECTOMY,WITH SALPINGO-OOPHORECTOMY;  Surgeon: Bryan Badillo MD;  Location: [facility];  Service: OB/GYN;  Laterality: Bilateral;         PHYSICAL EXAMINATION:  BP (!) 140/76   Pulse 71   Resp 18   Ht 5' 9" (1.753 m)   Wt (!) 162.8 kg (359 lb)   LMP 2023 (Exact Date)   SpO2 98%   BMI 53.02 kg/m²   General    Constitutional: She is oriented to person, place, and time. She appears well-nourished.   HENT:   Head: Normocephalic and atraumatic.   Eyes: Pupils are equal, round, and reactive to light.   Neck: Neck supple.   Cardiovascular:  Normal rate " and regular rhythm.            Pulmonary/Chest: Effort normal. No respiratory distress.   Abdominal: There is no abdominal tenderness. There is no guarding.   Neurological: She is alert and oriented to person, place, and time. She has normal reflexes.   Psychiatric: She has a normal mood and affect. Her behavior is normal. Judgment and thought content normal.           Right Knee Exam     Inspection   Swelling: present  Effusion: present    Tenderness   The patient is tender to palpation of the medial joint line and lateral joint line.    Crepitus   The patient has crepitus of the patella.    Range of Motion   Extension:  normal   Flexion:  abnormal     Tests   Meniscus   Jessica:  Medial - positive   Ligament Examination   Lachman: normal (-1 to 2mm)   PCL-Posterior Drawer: normal (0 to 2mm)     Patella   Patellar Tracking: normal  Patellar Grind: positive    Other   Sensation: normal    Vascular Exam     Right Pulses  Dorsalis Pedis:      2+          IMAGING:  No results found.     ASSESSMENT:      ICD-10-CM ICD-9-CM   1. Arthritis of right knee  M17.11 716.96       PLAN:     -Findings and treatment options were discussed with the patient  -All questions answered  Natural history and expected course discussed. Questions answered.  Educational materials distributed.  Reduction in offending activity.  OTC analgesics as needed.  Arthrocentesis. See procedure note.  Right knee injection today    RTC PRN  Dr Schultz patient    There are no Patient Instructions on file for this visit.      No orders of the defined types were placed in this encounter.        Large Joint Aspiration/Injection: R supra patellar bursa    Date/Time: 5/6/2025 2:00 PM    Performed by: Tawanna Virk FNP  Authorized by: Tawanna Virk FNP    Consent Done?:  Yes (Verbal)  Indications:  Pain  Site marked: the procedure site was marked    Local anesthetic:  Bupivacaine 0.25% without epinephrine    Details:  Needle Size:  22 G  Location:   Knee  Site:  R supra patellar bursa  Medications:  40 mg triamcinolone acetonide 40 mg/mL  Patient tolerance:  Patient tolerated the procedure well with no immediate complications

## 2025-06-10 ENCOUNTER — OFFICE VISIT (OUTPATIENT)
Dept: FAMILY MEDICINE | Facility: CLINIC | Age: 55
End: 2025-06-10
Payer: COMMERCIAL

## 2025-06-10 VITALS
BODY MASS INDEX: 43.4 KG/M2 | OXYGEN SATURATION: 98 % | DIASTOLIC BLOOD PRESSURE: 85 MMHG | TEMPERATURE: 98 F | SYSTOLIC BLOOD PRESSURE: 133 MMHG | HEART RATE: 70 BPM | HEIGHT: 69 IN | WEIGHT: 293 LBS | RESPIRATION RATE: 20 BRPM

## 2025-06-10 DIAGNOSIS — J32.9 SINUSITIS, UNSPECIFIED CHRONICITY, UNSPECIFIED LOCATION: Primary | ICD-10-CM

## 2025-06-10 PROCEDURE — 3075F SYST BP GE 130 - 139MM HG: CPT | Mod: ,,, | Performed by: NURSE PRACTITIONER

## 2025-06-10 PROCEDURE — 99203 OFFICE O/P NEW LOW 30 MIN: CPT | Mod: 25,,, | Performed by: NURSE PRACTITIONER

## 2025-06-10 PROCEDURE — 1160F RVW MEDS BY RX/DR IN RCRD: CPT | Mod: ,,, | Performed by: NURSE PRACTITIONER

## 2025-06-10 PROCEDURE — 1159F MED LIST DOCD IN RCRD: CPT | Mod: ,,, | Performed by: NURSE PRACTITIONER

## 2025-06-10 PROCEDURE — 96372 THER/PROPH/DIAG INJ SC/IM: CPT | Mod: ,,, | Performed by: NURSE PRACTITIONER

## 2025-06-10 PROCEDURE — 3008F BODY MASS INDEX DOCD: CPT | Mod: ,,, | Performed by: NURSE PRACTITIONER

## 2025-06-10 PROCEDURE — 3079F DIAST BP 80-89 MM HG: CPT | Mod: ,,, | Performed by: NURSE PRACTITIONER

## 2025-06-10 PROCEDURE — 4010F ACE/ARB THERAPY RXD/TAKEN: CPT | Mod: ,,, | Performed by: NURSE PRACTITIONER

## 2025-06-10 RX ORDER — MULTIVITAMIN
1 TABLET ORAL
COMMUNITY

## 2025-06-10 RX ORDER — PROMETHAZINE HYDROCHLORIDE AND DEXTROMETHORPHAN HYDROBROMIDE 6.25; 15 MG/5ML; MG/5ML
5 SYRUP ORAL EVERY 4 HOURS PRN
Qty: 120 ML | Refills: 0 | Status: SHIPPED | OUTPATIENT
Start: 2025-06-10 | End: 2025-06-20

## 2025-06-10 RX ORDER — CEFDINIR 300 MG/1
300 CAPSULE ORAL 2 TIMES DAILY
Qty: 20 CAPSULE | Refills: 0 | Status: SHIPPED | OUTPATIENT
Start: 2025-06-10 | End: 2025-06-20

## 2025-06-10 RX ORDER — DEXAMETHASONE SODIUM PHOSPHATE 4 MG/ML
6 INJECTION, SOLUTION INTRA-ARTICULAR; INTRALESIONAL; INTRAMUSCULAR; INTRAVENOUS; SOFT TISSUE
Status: COMPLETED | OUTPATIENT
Start: 2025-06-10 | End: 2025-06-10

## 2025-06-10 RX ORDER — METHYLPREDNISOLONE 4 MG/1
TABLET ORAL
Qty: 21 EACH | Refills: 0 | Status: SHIPPED | OUTPATIENT
Start: 2025-06-10 | End: 2025-07-01

## 2025-06-10 RX ADMIN — DEXAMETHASONE SODIUM PHOSPHATE 6 MG: 4 INJECTION, SOLUTION INTRA-ARTICULAR; INTRALESIONAL; INTRAMUSCULAR; INTRAVENOUS; SOFT TISSUE at 09:06

## 2025-06-10 NOTE — PROGRESS NOTES
"Subjective:       Patient ID: Raina Nuñez is a 54 y.o. female.    Chief Complaint: Cough and Sinus Problem (Sinus congestion, runny nose)      Presents to clinic with complaint of sinus and nasal congestion, postnasal drainage, cough, and chest congestion that started last week.  Denies fever.  Denies history of asthma.  No shortness of breath.    Cough  Associated symptoms include headaches and wheezing. Pertinent negatives include no ear pain, hemoptysis, sore throat or shortness of breath.   Sinus Problem  Associated symptoms include congestion, coughing and headaches. Pertinent negatives include no ear pain, shortness of breath or sore throat.     Review of Systems   Constitutional: Negative.    HENT:  Positive for congestion and sinus pain. Negative for ear pain and sore throat.    Respiratory:  Positive for cough and wheezing. Negative for hemoptysis, sputum production and shortness of breath.    Cardiovascular: Negative.    Musculoskeletal: Negative.    Neurological:  Positive for headaches.          Reviewed family, medical, surgical, and social history.    Objective:      /85 (BP Location: Left arm, Patient Position: Sitting)   Pulse 70   Temp 98 °F (36.7 °C) (Oral)   Resp 20   Ht 5' 9" (1.753 m)   Wt (!) 163.7 kg (361 lb)   LMP 02/20/2023 (Exact Date)   SpO2 98%   BMI 53.31 kg/m²   Physical Exam  Vitals and nursing note reviewed.   Constitutional:       General: She is not in acute distress.     Appearance: Normal appearance. She is normal weight. She is not ill-appearing, toxic-appearing or diaphoretic.   HENT:      Head: Normocephalic and atraumatic.      Right Ear: Hearing, tympanic membrane, ear canal and external ear normal.      Left Ear: Hearing, tympanic membrane, ear canal and external ear normal.      Nose: Nasal tenderness, mucosal edema, congestion and rhinorrhea present. Rhinorrhea is purulent.      Right Turbinates: Enlarged and swollen.      Left Turbinates: Enlarged " and swollen.      Right Sinus: Maxillary sinus tenderness and frontal sinus tenderness present.      Left Sinus: Maxillary sinus tenderness and frontal sinus tenderness present.      Mouth/Throat:      Mouth: Mucous membranes are moist.   Cardiovascular:      Rate and Rhythm: Normal rate and regular rhythm.      Heart sounds: Normal heart sounds.   Pulmonary:      Effort: Pulmonary effort is normal. No respiratory distress.      Breath sounds: Normal breath sounds. No stridor. No wheezing, rhonchi or rales.   Chest:      Chest wall: No tenderness.   Musculoskeletal:      Cervical back: Normal range of motion and neck supple. No rigidity or tenderness.   Lymphadenopathy:      Cervical: No cervical adenopathy.   Skin:     General: Skin is warm and dry.   Neurological:      Mental Status: She is alert.   Psychiatric:         Mood and Affect: Mood normal.         Behavior: Behavior normal.         Thought Content: Thought content normal.         Judgment: Judgment normal.            No visits with results within 1 Day(s) from this visit.   Latest known visit with results is:   Hospital Outpatient Visit on 12/21/2023   Component Date Value Ref Range Status    Case Report 12/21/2023    Final                    Value:Surgical Pathology                                Case: [id number]                                   Authorizing Provider:  Saeid Lora DO    Collected:           12/21/2023 11:30 AM          Ordering Location:     [facility] Received:            12/21/2023 12:17 PM                                 - Ultrasound Imaging                                                                                Center                                                                       Pathologist:           Lincoln Weiss MD                                                      Specimen:    Breast, Left                                                                               Final Diagnosis  12/21/2023    Final                    Value:A. Left breast mass, needle core biopsy:                          - Benign breast parenchyma with fibroadenomatoid change and usual ductal                           hyperplasia                          - No overt malignancy is identified    Comments 12/21/2023    Final                    Value:Immunohistochemistry for ER and CK5/6 support the diagnosis (performed on                           blocks A2 and A3).    Gross Description 12/21/2023    Final                    Value:A. Breast, Left, :                           The specimen is received in formalin designated Breast, Left and                           consists of multiple (20) hemorrhagic heterogeneous white-tan and                           yellow-tan needle core fragments ranging in length from 0.3-1.7 cm.  The                           needle core fragments are entirely submitted in cassettes A1-A3.                                                    Fixative: 10% Neutral Buffered Formalin                          Cold Ischemia Time:  Presumed less than 1 hour                          Fixation Time:  9 hours 30 minutes                                                    Grossing was completed by Clinton Castro.    Microscopic Description 12/21/2023    Final                    Value:A microscopic examination was performed and the diagnosis reflects the                           findings.                                                        Clinical Information 12/21/2023    Final                    Value:left breast mass    Laboratory Notes 12/21/2023    Final                    Value:If this report includes immunohistochemical (IHC) test results, please                           note the following: IHC studies were interpreted in conjunction with                           appropriate positive and negative controls which demonstrate the expected                           positive and negative reactivity.  This laboratory is regulated under CLIA                           as qualified to perform high-complexity testing. IHC tests are used for                           clinical purposes. They should not be regarded as investigational or                           research.                              Predictive Marker Testing 12/21/2023    Final                    Value:Specimen fixation and processing: The specimen that was tested is 10%                           neutral buffered formalin-fixed paraffin-embedded tissue sections. Test                           results are only valid for non-decalcified paraffin embedded specimens                           fixed in 10% neutral buffered formalin within 1 hour of acquisition and                           fixed between 6 and 72 hours. Delay to fixation, under fixation, or over                           fixation fall outside of ASCO/CAP guidelines and may affect these results.                                                     Method and Scoring: Predictive marker testing is performed using FDA                           approved [place] CONFIRM anti-Estrogen Receptor (ER) (SP1) Rabbit                           Monoclonal Primary Antibody, [place] CONFIRM anti-Progesterone Receptor                           (ME) (1E2) Rabbit Monoclonal Primary Antibody,  [place] PATHWAY                           anti-HER-2/meri (4B5) Rabbit Monoclonal Primary Antibody, and [place] HER2                           Dual MEGHAN DNA Probe Cocktail. Laboratory interpretation of predictive                           marker testing (IHC or MEGHAN) is reported according to the 's                           instructions, or when available, following the structure, format, and                           criteria set forth in the current CAP guidelines relating to predictive                           marker testing (ASCO/CAP HER2 and ER testing in breast cancer).                                                           Assessment:       1. Sinusitis, unspecified chronicity, unspecified location        Plan:       Sinusitis, unspecified chronicity, unspecified location  -     dexAMETHasone injection 6 mg  -     cefdinir (OMNICEF) 300 MG capsule; Take 1 capsule (300 mg total) by mouth 2 (two) times daily. for 10 days  Dispense: 20 capsule; Refill: 0  -     promethazine-dextromethorphan (PROMETHAZINE-DM) 6.25-15 mg/5 mL Syrp; Take 5 mLs by mouth every 4 (four) hours as needed (cough).  Dispense: 120 mL; Refill: 0  -     methylPREDNISolone (MEDROL DOSEPACK) 4 mg tablet; use as directed  Dispense: 21 each; Refill: 0    Drink plenty of fluids   Finish medications   Return to clinic as needed          Risks, benefits, and side effects were discussed with the patient. All questions were answered to the fullest satisfaction of the patient, and pt verbalized understanding and agreement to treatment plan. Pt was to call with any new or worsening symptoms, or present to the ER.

## 2025-08-06 ENCOUNTER — HOSPITAL ENCOUNTER (OUTPATIENT)
Dept: RADIOLOGY | Facility: HOSPITAL | Age: 55
Discharge: HOME | End: 2025-08-06
Attending: ORTHOPAEDIC SURGERY
Payer: COMMERCIAL

## 2025-08-06 ENCOUNTER — OFFICE VISIT (OUTPATIENT)
Dept: ORTHOPEDICS | Facility: CLINIC | Age: 55
End: 2025-08-06
Payer: COMMERCIAL

## 2025-08-06 VITALS
HEIGHT: 69 IN | OXYGEN SATURATION: 98 % | BODY MASS INDEX: 43.4 KG/M2 | HEART RATE: 94 BPM | DIASTOLIC BLOOD PRESSURE: 62 MMHG | WEIGHT: 293 LBS | SYSTOLIC BLOOD PRESSURE: 106 MMHG

## 2025-08-06 DIAGNOSIS — M25.561 RIGHT KNEE PAIN, UNSPECIFIED CHRONICITY: ICD-10-CM

## 2025-08-06 DIAGNOSIS — M25.562 LEFT KNEE PAIN, UNSPECIFIED CHRONICITY: Primary | ICD-10-CM

## 2025-08-06 DIAGNOSIS — M25.562 LEFT KNEE PAIN, UNSPECIFIED CHRONICITY: ICD-10-CM

## 2025-08-06 PROCEDURE — 3074F SYST BP LT 130 MM HG: CPT | Mod: S$GLB,,, | Performed by: ORTHOPAEDIC SURGERY

## 2025-08-06 PROCEDURE — 1159F MED LIST DOCD IN RCRD: CPT | Mod: S$GLB,,, | Performed by: ORTHOPAEDIC SURGERY

## 2025-08-06 PROCEDURE — 3008F BODY MASS INDEX DOCD: CPT | Mod: S$GLB,,, | Performed by: ORTHOPAEDIC SURGERY

## 2025-08-06 PROCEDURE — 99213 OFFICE O/P EST LOW 20 MIN: CPT | Mod: S$GLB,,, | Performed by: ORTHOPAEDIC SURGERY

## 2025-08-06 PROCEDURE — 3078F DIAST BP <80 MM HG: CPT | Mod: S$GLB,,, | Performed by: ORTHOPAEDIC SURGERY

## 2025-08-06 PROCEDURE — 73564 X-RAY EXAM KNEE 4 OR MORE: CPT | Mod: TC,LT

## 2025-08-06 PROCEDURE — 4010F ACE/ARB THERAPY RXD/TAKEN: CPT | Mod: S$GLB,,, | Performed by: ORTHOPAEDIC SURGERY

## 2025-08-06 PROCEDURE — 99999 PR PBB SHADOW E&M-EST. PATIENT-LVL III: CPT | Mod: PBBFAC,,, | Performed by: ORTHOPAEDIC SURGERY

## 2025-08-06 PROCEDURE — 63600175 PHARM REV CODE 636 W HCPCS

## 2025-08-06 NOTE — PROGRESS NOTES
Patient is here for right knee internal derangement she also has some pes bursitis on left knee tender over pes anserine bursa goes in her hand strain she was taught strengthening exercises for left knee.  X-rays show some mild degenerative changes left knee.  Her right knee she has some degenerative changes.  I injected her with 1 cc of Marcaine 1 cc Kenalog.  She would like to try viscosupplementation shot we are going to consider doing this in the near future.  I am we will get her pre-surgery.  I will check her back in 4-6 weeks when she has been approved and we will do the injection in his right knee with the viscosupplementation in the meantime she is doing strengthening in his left knee we may have to consider an injection in his left knee and get an MRI.  She is doing her strengthening exercises with the pes bursitis at this time.

## 2025-08-06 NOTE — PROGRESS NOTES
Radiology Interpretation        Patient Name: Raina Nuñez  Date: 8/6/2025  YOB: 1970  MRN# [id number]        ORDERING DIAGNOSIS:    Encounter Diagnoses   Name Primary?    Left knee pain, unspecified chronicity Yes    Right knee pain, unspecified chronicity         Four views left knee skeletally mature individual there is some mild degenerative changes medial compartment no fractures or subluxations no bony lesions impression mild degenerative changes left knee               Charanjit Schultz MD

## (undated) DEVICE — GLOVE PROTEXIS PI SYN SURG 6.0

## (undated) DEVICE — SUT VICRYL 0 CT-2 27 DYE

## (undated) DEVICE — SPONGE COTTON WOVEN 4X4IN

## (undated) DEVICE — PACK TIBURON BASIC

## (undated) DEVICE — SEAL UNIVERSAL 5MM-8MM XI

## (undated) DEVICE — PROGRASP DA VINCI

## (undated) DEVICE — STRIP MEDI WND CLSR 1/2X4IN

## (undated) DEVICE — KIT ROBOTIC RUSH

## (undated) DEVICE — TUBING INSUFFLATION HEATED

## (undated) DEVICE — GLOVE 6.0 PROTEXIS PI BLUE

## (undated) DEVICE — DRAPE ARM DAVINCI XI

## (undated) DEVICE — GLOVE PROTEXIS PI SYN SURG 6.5

## (undated) DEVICE — SYR 50ML CATH TIP

## (undated) DEVICE — OCCLUDER COLPO-PNEUMO STERILE

## (undated) DEVICE — GOWN SURGICAL SMARTGOWN LEVEL 4 / EXTRA LARGE STERILE

## (undated) DEVICE — NDL FILTER 19GA X 1-1/2

## (undated) DEVICE — SUT VICRYL PLUS 1 CTX 36IN

## (undated) DEVICE — SYR 10CC LUER LOCK

## (undated) DEVICE — SUT 0 VICRYL / UR6 (J603)

## (undated) DEVICE — SUT 4-0 VICRYL / FS-2

## (undated) DEVICE — GLOVE PROTEXIS PI SYN SURG 8.0

## (undated) DEVICE — OBTURATOR BLADELESS 8MM XI CLR

## (undated) DEVICE — SCISSOR HOT SHEARS XI

## (undated) DEVICE — TIP RUMI GREEN DISPOSABLE6.7MM

## (undated) DEVICE — GLOVE SURGICAL PROTEXIS PI SIZE 6

## (undated) DEVICE — COVER TIP CURVED SCISSORS XI

## (undated) DEVICE — APPLICATOR CHLORAPREP LITE ORANGE 10.5ML STERILE

## (undated) DEVICE — GLOVE SURGICAL PROTEXIS PI SIZE 7.5

## (undated) DEVICE — GLOVE PROTEXIS PI SYN SURG 7

## (undated) DEVICE — NDL DRIVER SUTURECUT MEGA XI

## (undated) DEVICE — SUTURE VICRYL 4-0 FS2 UNDYED 27 IN

## (undated) DEVICE — TUBING SUCTION 5MM STERILE 3/16IN X 12FT

## (undated) DEVICE — SUTURE ETHILON 5-0 18 BLK P-3

## (undated) DEVICE — SEALER VESSEL EXTEND

## (undated) DEVICE — GLOVE 6.5 PROTEXIS PI BLUE

## (undated) DEVICE — APPLICATOR FLOSEAL ENDO 35CM

## (undated) DEVICE — SYRINGE 10-12CC LURE -LOK TIP

## (undated) DEVICE — WARMER BLUE HEAT SCOPE 3-12MM

## (undated) DEVICE — DRESSING SPONGE GAUZE STL 4X4

## (undated) DEVICE — TROCAR ENDO Z THREAD KII 5X100

## (undated) DEVICE — MANIPULATOR TIP RUMI ORANGE

## (undated) DEVICE — SOL NACL IRR 3000ML

## (undated) DEVICE — GOWN POLY REINF BRTH SLV XL

## (undated) DEVICE — ADHESIVE MASTISOL VIAL 48/BX

## (undated) DEVICE — SOL NACL IRR 1000ML BTL

## (undated) DEVICE — MATRIX FLOSEAL HEMOSTATIC 10ML

## (undated) DEVICE — Device

## (undated) DEVICE — FORCEP FENESTRATED BIPOLAR